# Patient Record
Sex: MALE | Race: WHITE | NOT HISPANIC OR LATINO | Employment: FULL TIME | URBAN - METROPOLITAN AREA
[De-identification: names, ages, dates, MRNs, and addresses within clinical notes are randomized per-mention and may not be internally consistent; named-entity substitution may affect disease eponyms.]

---

## 2020-07-17 ENCOUNTER — OFFICE VISIT (OUTPATIENT)
Dept: OBGYN CLINIC | Facility: CLINIC | Age: 25
End: 2020-07-17
Payer: COMMERCIAL

## 2020-07-17 ENCOUNTER — APPOINTMENT (OUTPATIENT)
Dept: RADIOLOGY | Facility: CLINIC | Age: 25
End: 2020-07-17
Payer: COMMERCIAL

## 2020-07-17 VITALS
TEMPERATURE: 97.9 F | WEIGHT: 235 LBS | BODY MASS INDEX: 31.83 KG/M2 | HEIGHT: 72 IN | SYSTOLIC BLOOD PRESSURE: 132 MMHG | DIASTOLIC BLOOD PRESSURE: 70 MMHG

## 2020-07-17 DIAGNOSIS — G57.01 PIRIFORMIS SYNDROME OF RIGHT SIDE: ICD-10-CM

## 2020-07-17 DIAGNOSIS — M62.830 LUMBAR PARASPINAL MUSCLE SPASM: ICD-10-CM

## 2020-07-17 DIAGNOSIS — M54.50 LUMBAR PAIN: ICD-10-CM

## 2020-07-17 DIAGNOSIS — M54.50 LUMBAR PAIN: Primary | ICD-10-CM

## 2020-07-17 PROCEDURE — 99203 OFFICE O/P NEW LOW 30 MIN: CPT | Performed by: FAMILY MEDICINE

## 2020-07-17 PROCEDURE — 72110 X-RAY EXAM L-2 SPINE 4/>VWS: CPT

## 2020-07-17 RX ORDER — PREDNISONE 20 MG/1
40 TABLET ORAL DAILY
Qty: 10 TABLET | Refills: 0 | Status: SHIPPED | OUTPATIENT
Start: 2020-07-17 | End: 2020-07-22

## 2020-07-17 RX ORDER — IBUPROFEN 400 MG/1
TABLET ORAL EVERY 6 HOURS PRN
COMMUNITY

## 2020-07-17 RX ORDER — CYCLOBENZAPRINE HCL 10 MG
10 TABLET ORAL 3 TIMES DAILY PRN
Qty: 30 TABLET | Refills: 0 | Status: SHIPPED | OUTPATIENT
Start: 2020-07-17

## 2020-07-17 NOTE — PROGRESS NOTES
Assessment:     1  Lumbar pain    2  Piriformis syndrome of right side    3  Lumbar paraspinal muscle spasm        Plan:     Problem List Items Addressed This Visit        Nervous and Auditory    Piriformis syndrome of right side    Relevant Medications    predniSONE 20 mg tablet    cyclobenzaprine (FLEXERIL) 10 mg tablet       Other    Lumbar paraspinal muscle spasm    Relevant Medications    predniSONE 20 mg tablet    cyclobenzaprine (FLEXERIL) 10 mg tablet      Other Visit Diagnoses     Lumbar pain    -  Primary    Relevant Medications    predniSONE 20 mg tablet    cyclobenzaprine (FLEXERIL) 10 mg tablet    Other Relevant Orders    XR spine lumbar minimum 4 views non injury         Subjective:     Patient ID: Jacobo Jiang is a 21 y o  male  Chief Complaint:  Patient is a 54-year-old male presenting today for evaluation of low back pain  He reports approximately 1 week ago when sitting down into a chair feeling immediate onset of pain in the lower back described as a pulling sensation  Pain does radiate into the left buttocks region as a tightness sensation and extends down into the posterior aspect of his leg  He has been using 800 mg of ibuprofen daily which seems to provide minimal relief  He does not report any noted weakness in his lower extremities  He denies any loss of bowel bladder function  He denies any saddle anesthesia  Allergy:  No Known Allergies  Medications:  all current active meds have been reviewed  Past Medical History:  History reviewed  No pertinent past medical history  Past Surgical History:  History reviewed  No pertinent surgical history  Family History:  History reviewed  No pertinent family history    Social History:  Social History     Substance and Sexual Activity   Alcohol Use Not on file     Social History     Substance and Sexual Activity   Drug Use Not on file     Social History     Tobacco Use   Smoking Status Never Smoker   Smokeless Tobacco Never Used     Review of Systems   Constitutional: Negative  HENT: Negative  Eyes: Negative  Respiratory: Negative  Cardiovascular: Negative  Gastrointestinal: Negative  Genitourinary: Negative  Musculoskeletal: Positive for arthralgias, back pain and myalgias  Skin: Negative  Allergic/Immunologic: Negative  Neurological: Negative  Hematological: Negative  Psychiatric/Behavioral: Negative  Objective:  BP Readings from Last 1 Encounters:   07/17/20 132/70      Wt Readings from Last 1 Encounters:   07/17/20 107 kg (235 lb)      BMI:   Estimated body mass index is 31 87 kg/m² as calculated from the following:    Height as of this encounter: 6' (1 829 m)  Weight as of this encounter: 107 kg (235 lb)  BSA:   Estimated body surface area is 2 29 meters squared as calculated from the following:    Height as of this encounter: 6' (1 829 m)  Weight as of this encounter: 107 kg (235 lb)  Physical Exam   Constitutional: He is oriented to person, place, and time  He appears well-developed and well-nourished  HENT:   Head: Normocephalic  Eyes: Pupils are equal, round, and reactive to light  Neck: Normal range of motion  Pulmonary/Chest: Effort normal    Musculoskeletal: Normal range of motion  Neurological: He is alert and oriented to person, place, and time  Skin: Skin is warm  Psychiatric: He has a normal mood and affect  Back Exam     Tenderness   The patient is experiencing tenderness in the lumbar and sacroiliac  Range of Motion   Extension: normal   Flexion: normal     Tests   Straight leg raise right: negative  Straight leg raise left: negative    Other   Sensation: normal    Comments:  5/5 muscle strength with great toe extension ankle inversion and eversion bilaterally            I have personally reviewed pertinent films in PACS     No acute osseous abnormalities

## 2022-12-31 ENCOUNTER — OFFICE VISIT (OUTPATIENT)
Dept: URGENT CARE | Facility: CLINIC | Age: 27
End: 2022-12-31

## 2022-12-31 ENCOUNTER — HOSPITAL ENCOUNTER (EMERGENCY)
Facility: HOSPITAL | Age: 27
Discharge: HOME | End: 2022-12-31
Attending: EMERGENCY MEDICINE

## 2022-12-31 ENCOUNTER — APPOINTMENT (EMERGENCY)
Dept: CT IMAGING | Facility: HOSPITAL | Age: 27
End: 2022-12-31

## 2022-12-31 VITALS
SYSTOLIC BLOOD PRESSURE: 140 MMHG | HEIGHT: 73 IN | BODY MASS INDEX: 32.47 KG/M2 | RESPIRATION RATE: 20 BRPM | HEART RATE: 77 BPM | DIASTOLIC BLOOD PRESSURE: 86 MMHG | WEIGHT: 245 LBS | OXYGEN SATURATION: 97 % | TEMPERATURE: 98.2 F

## 2022-12-31 VITALS
OXYGEN SATURATION: 97 % | WEIGHT: 250 LBS | SYSTOLIC BLOOD PRESSURE: 139 MMHG | HEIGHT: 73 IN | TEMPERATURE: 97.5 F | BODY MASS INDEX: 33.13 KG/M2 | RESPIRATION RATE: 18 BRPM | HEART RATE: 73 BPM | DIASTOLIC BLOOD PRESSURE: 81 MMHG

## 2022-12-31 DIAGNOSIS — K57.90 DIVERTICULOSIS: ICD-10-CM

## 2022-12-31 DIAGNOSIS — R10.9 ABDOMINAL PAIN: Primary | ICD-10-CM

## 2022-12-31 DIAGNOSIS — K21.9 GERD (GASTROESOPHAGEAL REFLUX DISEASE): ICD-10-CM

## 2022-12-31 DIAGNOSIS — K65.4 MESENTERIC PANNICULITIS (HCC): ICD-10-CM

## 2022-12-31 DIAGNOSIS — R10.31 RIGHT LOWER QUADRANT ABDOMINAL PAIN: Primary | ICD-10-CM

## 2022-12-31 LAB
ALBUMIN SERPL BCP-MCNC: 4.1 G/DL (ref 3.5–5)
ALP SERPL-CCNC: 61 U/L (ref 46–116)
ALT SERPL W P-5'-P-CCNC: 45 U/L (ref 12–78)
ANION GAP SERPL CALCULATED.3IONS-SCNC: 4 MMOL/L (ref 4–13)
AST SERPL W P-5'-P-CCNC: 32 U/L (ref 5–45)
BASOPHILS # BLD AUTO: 0.03 THOUSANDS/ÂΜL (ref 0–0.1)
BASOPHILS NFR BLD AUTO: 1 % (ref 0–1)
BILIRUB SERPL-MCNC: 0.4 MG/DL (ref 0.2–1)
BUN SERPL-MCNC: 15 MG/DL (ref 5–25)
CALCIUM SERPL-MCNC: 9.2 MG/DL (ref 8.3–10.1)
CARDIAC TROPONIN I PNL SERPL HS: 3 NG/L
CHLORIDE SERPL-SCNC: 106 MMOL/L (ref 96–108)
CO2 SERPL-SCNC: 30 MMOL/L (ref 21–32)
CREAT SERPL-MCNC: 0.88 MG/DL (ref 0.6–1.3)
EOSINOPHIL # BLD AUTO: 0.26 THOUSAND/ÂΜL (ref 0–0.61)
EOSINOPHIL NFR BLD AUTO: 4 % (ref 0–6)
ERYTHROCYTE [DISTWIDTH] IN BLOOD BY AUTOMATED COUNT: 12.1 % (ref 11.6–15.1)
GFR SERPL CREATININE-BSD FRML MDRD: 116 ML/MIN/1.73SQ M
GLUCOSE SERPL-MCNC: 88 MG/DL (ref 65–140)
HCT VFR BLD AUTO: 44.7 % (ref 36.5–49.3)
HGB BLD-MCNC: 15.1 G/DL (ref 12–17)
IMM GRANULOCYTES # BLD AUTO: 0.02 THOUSAND/UL (ref 0–0.2)
IMM GRANULOCYTES NFR BLD AUTO: 0 % (ref 0–2)
LIPASE SERPL-CCNC: 85 U/L (ref 73–393)
LYMPHOCYTES # BLD AUTO: 2.23 THOUSANDS/ÂΜL (ref 0.6–4.47)
LYMPHOCYTES NFR BLD AUTO: 34 % (ref 14–44)
MCH RBC QN AUTO: 30.9 PG (ref 26.8–34.3)
MCHC RBC AUTO-ENTMCNC: 33.8 G/DL (ref 31.4–37.4)
MCV RBC AUTO: 91 FL (ref 82–98)
MONOCYTES # BLD AUTO: 0.73 THOUSAND/ÂΜL (ref 0.17–1.22)
MONOCYTES NFR BLD AUTO: 11 % (ref 4–12)
NEUTROPHILS # BLD AUTO: 3.36 THOUSANDS/ÂΜL (ref 1.85–7.62)
NEUTS SEG NFR BLD AUTO: 50 % (ref 43–75)
NRBC BLD AUTO-RTO: 0 /100 WBCS
PLATELET # BLD AUTO: 284 THOUSANDS/UL (ref 149–390)
PMV BLD AUTO: 9.8 FL (ref 8.9–12.7)
POTASSIUM SERPL-SCNC: 3.9 MMOL/L (ref 3.5–5.3)
PROT SERPL-MCNC: 6.9 G/DL (ref 6.4–8.4)
RBC # BLD AUTO: 4.89 MILLION/UL (ref 3.88–5.62)
SODIUM SERPL-SCNC: 140 MMOL/L (ref 135–147)
WBC # BLD AUTO: 6.63 THOUSAND/UL (ref 4.31–10.16)

## 2022-12-31 RX ORDER — PANTOPRAZOLE SODIUM 20 MG/1
20 TABLET, DELAYED RELEASE ORAL DAILY
Qty: 7 TABLET | Refills: 0 | Status: SHIPPED | OUTPATIENT
Start: 2022-12-31 | End: 2023-01-07

## 2022-12-31 RX ORDER — PANTOPRAZOLE SODIUM 40 MG/1
40 TABLET, DELAYED RELEASE ORAL ONCE
Status: COMPLETED | OUTPATIENT
Start: 2022-12-31 | End: 2022-12-31

## 2022-12-31 RX ADMIN — SODIUM CHLORIDE 1000 ML: 0.9 INJECTION, SOLUTION INTRAVENOUS at 19:09

## 2022-12-31 RX ADMIN — IOHEXOL 100 ML: 350 INJECTION, SOLUTION INTRAVENOUS at 20:21

## 2022-12-31 RX ADMIN — PANTOPRAZOLE SODIUM 40 MG: 40 TABLET, DELAYED RELEASE ORAL at 21:55

## 2022-12-31 NOTE — PROGRESS NOTES
[facility]        NAME: Blanche Hagan is a 28 y o  male  : 1995    MRN: [id number]  DATE: 2022  TIME: 6:51 PM    Assessment and Plan   Right lower quadrant abdominal pain [R10 31]  1  Right lower quadrant abdominal pain  Transfer to other facility            Patient Instructions       Follow up with PCP in 3-5 days  Proceed to  ER if symptoms worsen  Chief Complaint     Chief Complaint   Patient presents with   • Abdominal Pain     PT presents with left upper quadrant pain that is colicky x 4 weeks  Pt also states indigestion, and states he gets lightheaded sometimes during these episodes  History of Present Illness       79-year-old male presents with fever sensation and lightheadedness abdominal pain black stools  Denies any vomiting or diarrhea  He reports that appetite has not been the same for the past week and not being very hungry and wanting to eat stuff  Reports he has been using Pepto-Bismol over the past week  Denies any chest pain shortness of breath  Reports he is getting waves of sweats and fever sensation and he gets very lightheaded  Abdominal Pain  This is a new problem  The current episode started 1 to 4 weeks ago  The onset quality is gradual  The problem occurs constantly  The problem has been gradually worsening  The pain is located in the generalized abdominal region  The pain is moderate  The quality of the pain is burning  The abdominal pain does not radiate  Associated symptoms include anorexia, belching, a fever, flatus and melena  Pertinent negatives include no arthralgias, constipation, diarrhea, dysuria, frequency, headaches, hematochezia, hematuria, myalgias, nausea, vomiting or weight loss  Nothing aggravates the pain  The pain is relieved by being still  Treatments tried: Pepto-Bismol and Tums  The treatment provided no relief  There is no history of abdominal surgery         Review of Systems   Review of Systems   Constitutional: Positive for fever  Negative for weight loss  HENT: Negative  Eyes: Negative  Respiratory: Negative  Cardiovascular: Negative  Gastrointestinal: Positive for abdominal pain, anorexia, flatus and melena  Negative for constipation, diarrhea, hematochezia, nausea and vomiting  Genitourinary: Negative for dysuria, frequency and hematuria  Musculoskeletal: Negative  Negative for arthralgias and myalgias  Skin: Negative  Neurological: Negative  Negative for headaches  Current Medications       Current Outpatient Medications:   •  cyclobenzaprine (FLEXERIL) 10 mg tablet, Take 1 tablet (10 mg total) by mouth 3 (three) times a day as needed for muscle spasms (Patient not taking: Reported on 12/31/2022), Disp: 30 tablet, Rfl: 0  •  ibuprofen (MOTRIN) 400 mg tablet, Take by mouth every 6 (six) hours as needed for mild pain (Patient not taking: Reported on 12/31/2022), Disp: , Rfl:     Current Allergies     Allergies as of 12/31/2022   • (No Known Allergies)            The following portions of the patient's history were reviewed and updated as appropriate: allergies, current medications, past family history, past medical history, past social history, past surgical history and problem list      History reviewed  No pertinent past medical history  No past surgical history on file  No family history on file  Medications have been verified  Objective   /81   Pulse 73   Temp 97 5 °F (36 4 °C)   Resp 18   Ht 6' 1" (1 854 m)   Wt 113 kg (250 lb)   SpO2 97%   BMI 32 98 kg/m²   No LMP for male patient  Physical Exam     Physical Exam  Vitals and nursing note reviewed  Constitutional:       General: He is not in acute distress  Appearance: Normal appearance  He is well-developed  HENT:      Head: Normocephalic and atraumatic  Right Ear: Hearing, tympanic membrane, ear canal and external ear normal  There is no impacted cerumen        Left Ear: Hearing, tympanic membrane, ear canal and external ear normal  There is no impacted cerumen  Nose: Nose normal       Mouth/Throat:      Pharynx: Uvula midline  No oropharyngeal exudate  Eyes:      General:         Right eye: No discharge  Left eye: No discharge  Conjunctiva/sclera: Conjunctivae normal    Cardiovascular:      Rate and Rhythm: Normal rate and regular rhythm  Heart sounds: Normal heart sounds  No murmur heard  Pulmonary:      Effort: Pulmonary effort is normal  No respiratory distress  Breath sounds: Normal breath sounds  No wheezing or rales  Abdominal:      General: Bowel sounds are normal       Palpations: Abdomen is soft  Tenderness: There is abdominal tenderness in the right lower quadrant  There is guarding  There is no right CVA tenderness, left CVA tenderness or rebound  Positive signs include Rovsing's sign and McBurney's sign  Negative signs include De Leon's sign  Musculoskeletal:         General: Normal range of motion  Cervical back: Normal range of motion and neck supple  Lymphadenopathy:      Cervical: No cervical adenopathy  Skin:     General: Skin is warm and dry  Neurological:      Mental Status: He is alert and oriented to person, place, and time     Psychiatric:         Mood and Affect: Mood normal          Patient sent to ER via personal vehicle to rule out possible appendicitis

## 2023-01-01 NOTE — ED PROVIDER NOTES
History  Chief Complaint   Patient presents with   • Abdominal Pain     RLQ abdominal pain when pressed by doctor at [facility]  C/O of being lightheadedness  28year old male sent from urgent care for evaluation of right lower quadrant abdominal tenderness on their exam  Patient states he has had burning epigastric pain radiating to the substernal chest intermittently for the past 3 weeks  Patient states these episodes occur at random times with no exacerbating or alleviating factors  Patient reports that he has been having belching, diaphoresis and lightheadedness following the onset of this burning pain  He has been taking Pepto Bismol and Mylanta for his symptoms up to twice daily with last dose taken this afternoon around 2:30 pm  Patient reports dark stools since starting Pepto Bismol  Patient reports drinking 12-13 seltzers on Fridays and Saturdays  No smoking or drug use  History provided by:  Patient  Abdominal Pain  Pain location:  Epigastric  Pain quality: burning    Pain radiates to:  Chest  Duration:  3 weeks  Timing:  Intermittent  Chronicity:  New  Relieved by:  Nothing  Worsened by:  Nothing  Ineffective treatments:  OTC medications  Associated symptoms: chest pain and chills    Associated symptoms: no constipation, no cough, no diarrhea, no fever, no nausea, no shortness of breath and no vomiting    Risk factors: has not had multiple surgeries        Prior to Admission Medications   Prescriptions Last Dose Informant Patient Reported? Taking? cyclobenzaprine (FLEXERIL) 10 mg tablet   No No   Sig: Take 1 tablet (10 mg total) by mouth 3 (three) times a day as needed for muscle spasms   Patient not taking: Reported on 12/31/2022   ibuprofen (MOTRIN) 400 mg tablet   Yes No   Sig: Take by mouth every 6 (six) hours as needed for mild pain   Patient not taking: Reported on 12/31/2022      Facility-Administered Medications: None       No past medical history on file      No past surgical history on file  No family history on file  I have reviewed and agree with the history as documented  E-Cigarette/Vaping     E-Cigarette/Vaping Substances     Social History     Tobacco Use   • Smoking status: Never   • Smokeless tobacco: Never   Substance Use Topics   • Alcohol use: Never   • Drug use: Never       Review of Systems   Constitutional: Positive for chills  Negative for fever  HENT: Negative for congestion  Respiratory: Negative for cough and shortness of breath  Cardiovascular: Positive for chest pain  Gastrointestinal: Positive for abdominal pain  Negative for constipation, diarrhea, nausea and vomiting  Neurological: Positive for light-headedness  Negative for headaches  All other systems reviewed and are negative  Physical Exam  Physical Exam  Vitals and nursing note reviewed  Constitutional:       General: He is not in acute distress  Appearance: He is well-developed  He is not toxic-appearing or diaphoretic  HENT:      Head: Normocephalic and atraumatic  Right Ear: External ear normal       Left Ear: External ear normal       Nose: Nose normal    Eyes:      General: No scleral icterus  Cardiovascular:      Rate and Rhythm: Normal rate and regular rhythm  Pulses: Normal pulses  Pulmonary:      Effort: Pulmonary effort is normal  No respiratory distress  Abdominal:      General: There is no distension  Palpations: Abdomen is soft  Tenderness: There is no abdominal tenderness  Musculoskeletal:         General: No deformity  Normal range of motion  Skin:     Findings: No rash  Neurological:      General: No focal deficit present  Mental Status: He is alert        Gait: Gait normal    Psychiatric:         Mood and Affect: Mood normal          Vital Signs  ED Triage Vitals [12/31/22 1859]   Temperature Pulse Respirations Blood Pressure SpO2   98 2 °F (36 8 °C) 79 20 170/88 100 %      Temp Source Heart Rate Source Patient Position - Orthostatic VS BP Location FiO2 (%)   Oral Monitor -- Right arm --      Pain Score       No Pain           Vitals:    12/31/22 1859 12/31/22 2030 12/31/22 2045   BP: 170/88  144/65   Pulse: 79 78 75         Visual Acuity      ED Medications  Medications   pantoprazole (PROTONIX) EC tablet 40 mg (has no administration in time range)   sodium chloride 0 9 % bolus 1,000 mL (0 mL Intravenous Stopped 12/31/22 2032)   iohexol (OMNIPAQUE) 350 MG/ML injection (SINGLE-DOSE) 100 mL (100 mL Intravenous Given 12/31/22 2021)       Diagnostic Studies  Results Reviewed     Procedure Component Value Units Date/Time    HS Troponin 0hr (reflex protocol) [[id number]]  (Normal) Collected: 12/31/22 1908    Lab Status: Final result Specimen: Blood from Arm, Left Updated: 12/31/22 1943     hs TnI 0hr 3 ng/L     Comprehensive metabolic panel [[id number]] Collected: 12/31/22 1908    Lab Status: Final result Specimen: Blood from Arm, Left Updated: 12/31/22 1942     Sodium 140 mmol/L      Potassium 3 9 mmol/L      Chloride 106 mmol/L      CO2 30 mmol/L      ANION GAP 4 mmol/L      BUN 15 mg/dL      Creatinine 0 88 mg/dL      Glucose 88 mg/dL      Calcium 9 2 mg/dL      AST 32 U/L      ALT 45 U/L      Alkaline Phosphatase 61 U/L      Total Protein 6 9 g/dL      Albumin 4 1 g/dL      Total Bilirubin 0 40 mg/dL      eGFR 116 ml/min/1 73sq m     Narrative:      [place] guidelines for Chronic Kidney Disease (CKD):   •  Stage 1 with normal or high GFR (GFR > 90 mL/min/1 73 square meters)  •  Stage 2 Mild CKD (GFR = 60-89 mL/min/1 73 square meters)  •  Stage 3A Moderate CKD (GFR = 45-59 mL/min/1 73 square meters)  •  Stage 3B Moderate CKD (GFR = 30-44 mL/min/1 73 square meters)  •  Stage 4 Severe CKD (GFR = 15-29 mL/min/1 73 square meters)  •  Stage 5 End Stage CKD (GFR <15 mL/min/1 73 square meters)  Note: GFR calculation is accurate only with a steady state creatinine    Lipase [[id number]]  (Normal) Collected: 12/31/22 1908    Lab Status: Final result Specimen: Blood from Arm, Left Updated: 12/31/22 1942     Lipase 85 u/L     CBC and differential [[id number]] Collected: 12/31/22 1908    Lab Status: Final result Specimen: Blood from Arm, Left Updated: 12/31/22 1915     WBC 6 63 Thousand/uL      RBC 4 89 Million/uL      Hemoglobin 15 1 g/dL      Hematocrit 44 7 %      MCV 91 fL      MCH 30 9 pg      MCHC 33 8 g/dL      RDW 12 1 %      MPV 9 8 fL      Platelets 484 Thousands/uL      nRBC 0 /100 WBCs      Neutrophils Relative 50 %      Immat GRANS % 0 %      Lymphocytes Relative 34 %      Monocytes Relative 11 %      Eosinophils Relative 4 %      Basophils Relative 1 %      Neutrophils Absolute 3 36 Thousands/µL      Immature Grans Absolute 0 02 Thousand/uL      Lymphocytes Absolute 2 23 Thousands/µL      Monocytes Absolute 0 73 Thousand/µL      Eosinophils Absolute 0 26 Thousand/µL      Basophils Absolute 0 03 Thousands/µL                  CT abdomen pelvis with contrast   Final Result by Skyler Sandoval MD (12/31 2109)      No acute intra-abdominal abnormality  No free air or free fluid  Normal appendix visualized  Colonic diverticulosis with no inflammatory changes present to suggest acute diverticulitis  Multiple prominent but nonenlarged mesenteric root lymph nodes with minimal haziness of the mesenteric root fat suggestive of a chronic mesenteric panniculitis  No prior studies are available for comparison  Consider a repeat CT abdomen/pelvis in 6    months to assess for stability        Workstation performed: [id number]                    Procedures  ECG 12 Lead Documentation Only    Date/Time: 12/31/2022 7:05 PM  Performed by: Cherelle Castro MD  Authorized by: Cherelle Castro MD     Indications / Diagnosis:  Chest pain, lightheaded  ECG reviewed by me, the ED Provider: yes    Patient location:  ED  Previous ECG:     Previous ECG:  Unavailable  Interpretation:     Interpretation: normal Rate:     ECG rate:  78    ECG rate assessment: normal    Rhythm:     Rhythm: sinus rhythm    Ectopy:     Ectopy: none    QRS:     QRS axis:  Normal    QRS intervals:  Normal  Conduction:     Conduction: normal    ST segments:     ST segments:  Normal  T waves:     T waves: normal               ED Course             HEART Risk Score    Flowsheet Row Most Recent Value   Heart Score Risk Calculator    History 0 Filed at: 12/31/2022 2130   ECG 0 Filed at: 12/31/2022 2130   Age 0 Filed at: 12/31/2022 2130   Risk Factors 0 Filed at: 12/31/2022 2130   Troponin 0 Filed at: 12/31/2022 2130   HEART Score 0 Filed at: 12/31/2022 2130                        SBIRT 20yo+    Flowsheet Row Most Recent Value   SBIRT (23 yo +)    In order to provide better care to our patients, we are screening all of our patients for alcohol and drug use  Would it be okay to ask you these screening questions? Yes Filed at: 12/31/2022 1901   Initial Alcohol Screen: US AUDIT-C     1  How often do you have a drink containing alcohol? 3 Filed at: 12/31/2022 1901   2  How many drinks containing alcohol do you have on a typical day you are drinking? 3 Filed at: 12/31/2022 1901   3a  Male UNDER 65: How often do you have five or more drinks on one occasion? 3 Filed at: 12/31/2022 1901   3b  FEMALE Any Age, or MALE 65+: How often do you have 4 or more drinks on one occassion? 0 Filed at: 12/31/2022 1901   Audit-C Score 9 Filed at: 12/31/2022 1901   Full Alcohol Screen: US AUDIT    4  How often during the last year have you found that you were not able to stop drinking once you had started? 0 Filed at: 12/31/2022 1901   5  How often during past year have you failed to do what was normally expected of you because of drinking? 0 Filed at: 12/31/2022 1901   6  How often in past year have you needed a first drink in the morning to get yourself going after a heavy drinking session? 0 Filed at: 12/31/2022 1901   7   How often in past year have you had feeling of guilt or remorse after drinking? 0 Filed at: 12/31/2022 1901   8  How often in past year have you been unable to remember what happened night before because you had been drinking? 0 Filed at: 12/31/2022 1901   9  Have you or someone else been injured as a result of your drinking? 0 Filed at: 12/31/2022 1901   10  Has a relative, friend, doctor or other health worker been concerned about your drinking and suggested you cut down?  0 Filed at: 12/31/2022 1901   AUDIT Total Score 9 Filed at: 12/31/2022 1901   UBALDO: How many times in the past year have you    Used an illegal drug or used a prescription medication for non-medical reasons? Never Filed at: 12/31/2022 1901                    MDM  Number of Diagnoses or Management Options  Abdominal pain: new and requires workup  Diverticulosis: minor  GERD (gastroesophageal reflux disease): new and requires workup  Mesenteric panniculitis [facility]): minor  Diagnosis management comments: 28year old male sent from urgent care for evaluation of abdominal pain  No abdominal tenderness on my exam; however, patient had been reported to have RLQ tenderness and guarding at urgent care  CT abd/pelvis without acute findings  Patient informed of incidental findings  Suspect symptoms secondary to GERD  Short course of protonix prescribed  PCP follow up  Return precautions provided         Amount and/or Complexity of Data Reviewed  Clinical lab tests: ordered and reviewed  Tests in the radiology section of CPT®: ordered and reviewed        Disposition  Final diagnoses:   Abdominal pain   Mesenteric panniculitis ([address] )   Diverticulosis   GERD (gastroesophageal reflux disease)     Time reflects when diagnosis was documented in both MDM as applicable and the Disposition within this note     Time User Action Codes Description Comment    12/31/2022  9:18 PM Anuradha Mejias Add [R10 9] Abdominal pain     12/31/2022  9:19 PM Anuradha Mejias Add [K65 4] Mesenteric panniculitis ([address] ) 12/31/2022  9:19 PM Kelsea Ross Add [K57 90] Diverticulosis     12/31/2022  9:24 PM Ibis Presley Add [K21 9] GERD (gastroesophageal reflux disease)       ED Disposition     ED Disposition   Discharge    Condition   Stable    Date/Time   Sat Dec 31, 2022  9:19 PM    Comment   [place] discharge to home/self care  Follow-up Information     Follow up With Specialties Details Why Contact Info Additional Information    your primary care provider  Schedule an appointment as soon as possible for a visit in 1 week for re-evaluation       [address] Emergency Department Emergency Medicine Go to  If symptoms worsen [address] [place],[address] [address]  [address] [facility] Emergency Department, [address] [facility] , [address]    [address]  Call  if you need help finding a primary care provider [phone]             Patient's Medications   Discharge Prescriptions    PANTOPRAZOLE (PROTONIX) 20 MG TABLET    Take 1 tablet (20 mg total) by mouth daily for 7 days       Start Date: 12/31/2022End Date: 1/7/2023       Order Dose: 20 mg       Quantity: 7 tablet    Refills: 0       No discharge procedures on file      PDMP Review     None          ED Provider  Electronically Signed by           Mario Anderson MD  12/31/22 2127       Mario Anderson MD  12/31/22 2130

## 2023-01-01 NOTE — DISCHARGE INSTRUCTIONS
Your CT showed "Multiple prominent but nonenlarged mesenteric root lymph nodes with minimal haziness of the mesenteric root fat suggestive of a chronic mesenteric panniculitis  No prior studies are available for comparison  Consider a repeat CT abdomen/pelvis in 6   months to assess for stability "  Please follow with your primary care provider to arrange for repeat CT in 6 months

## 2023-01-07 LAB
ATRIAL RATE: 78 BPM
P AXIS: 62 DEGREES
PR INTERVAL: 166 MS
QRS AXIS: 26 DEGREES
QRSD INTERVAL: 92 MS
QT INTERVAL: 360 MS
QTC INTERVAL: 410 MS
T WAVE AXIS: 58 DEGREES
VENTRICULAR RATE: 78 BPM

## 2023-05-02 ENCOUNTER — TELEPHONE (OUTPATIENT)
Dept: GASTROENTEROLOGY | Facility: CLINIC | Age: 28
End: 2023-05-02

## 2023-05-02 ENCOUNTER — OFFICE VISIT (OUTPATIENT)
Dept: GASTROENTEROLOGY | Facility: CLINIC | Age: 28
End: 2023-05-02

## 2023-05-02 VITALS
SYSTOLIC BLOOD PRESSURE: 142 MMHG | BODY MASS INDEX: 33.66 KG/M2 | WEIGHT: 254 LBS | HEIGHT: 73 IN | DIASTOLIC BLOOD PRESSURE: 84 MMHG

## 2023-05-02 DIAGNOSIS — K21.9 GASTROESOPHAGEAL REFLUX DISEASE, UNSPECIFIED WHETHER ESOPHAGITIS PRESENT: Primary | ICD-10-CM

## 2023-05-02 DIAGNOSIS — R07.89 OTHER CHEST PAIN: ICD-10-CM

## 2023-05-02 DIAGNOSIS — R93.5 ABNORMAL CT OF THE ABDOMEN: ICD-10-CM

## 2023-05-02 RX ORDER — OMEPRAZOLE 40 MG/1
40 CAPSULE, DELAYED RELEASE ORAL DAILY
Qty: 30 CAPSULE | Refills: 5 | Status: SHIPPED | OUTPATIENT
Start: 2023-05-02

## 2023-05-02 RX ORDER — OMEPRAZOLE 20 MG/1
20 CAPSULE, DELAYED RELEASE ORAL DAILY
COMMUNITY
End: 2023-05-02

## 2023-05-02 NOTE — TELEPHONE ENCOUNTER
Scheduled date of EGD(as of today):5/12/23  Physician performing EGD:LAWRENCE  Location of EGD:[facility]  Instructions reviewed with patient by:  Clearances: N

## 2023-05-02 NOTE — PROGRESS NOTES
[id number] - Outpatient Consultation  Jessica Oliveira 28 y o  male MRN: [id number]  Encounter: [id number]    ASSESSMENT AND PLAN:      1  Gastroesophageal reflux disease, unspecified whether esophagitis present  27-year-old male here to has a new patient after a ER visit back in December for chest pain  At that time, given PPI therapy with some resolution  Sounds like between the weight gain, excessive alcohol use which he did cut down about 3 months ago, stress of having a  (third daughter was born in 2022), his reflux is really aggravating him  -GERD lifestyle modifications  -Limit p o  intake in the evenings  -We will increase omeprazole to 40 mg daily  -Check EGD  - omeprazole (PriLOSEC) 40 MG capsule; Take 1 capsule (40 mg total) by mouth daily  Dispense: 30 capsule; Refill: 5    2  Abnormal CT of the abdomen  Noted on CT back in December when he was in the ER  Some panniculitis with some enlarged lymph nodes  We will follow-up sometime this summer     - Basic metabolic panel; Future  - CT abdomen pelvis w contrast; Future    3  Other chest pain        Followup Appointment: 3 months  ______________________________________________________________________    Chief Complaint   Patient presents with    GERD     Chest pain       HPI:   Jessica Oliveira is a 28y o  year old male who presents today for reflux and chest   Patient states that during , he did drink excessively and put on some weight  Cut down his alcohol intake significantly for the past 3 months  He also had his third daughter was born in 2022  From 2022, he started having severe heartburn and when they had such bad chest pain that he was worried he was having heart attack  He went to the emergency room, was ruled out for acute coronary syndrome  CT with some mild mesenteric inflammation  Labs otherwise unremarkable    Patient was sent home on 2 weeks of "omeprazole  He was having intermittent heartburn and gas and belching but was feeling okay until the past 3 weeks when all the symptoms started again  Back on the over-the-counter omeprazole but not enough  No dysphagia or odynophagia  No vomiting  No GI bleeding  Bowels are okay  Historical Information   Past Medical History:   Diagnosis Date    Anxiety     GERD (gastroesophageal reflux disease)      History reviewed  No pertinent surgical history  Social History     Substance and Sexual Activity   Alcohol Use Yes    Comment: weekends     Social History     Substance and Sexual Activity   Drug Use Never     Social History     Tobacco Use   Smoking Status Former    Types: Cigarettes   Smokeless Tobacco Former     Family History   Problem Relation Age of Onset    No Known Problems Mother     Cancer Father     No Known Problems Brother     Colon cancer Neg Hx     Colon polyps Neg Hx        Meds/Allergies     Current Outpatient Medications:     omeprazole (PriLOSEC) 40 MG capsule    No Known Allergies    PHYSICAL EXAM:    Blood pressure 142/84, height 6' 1\" (1 854 m), weight 115 kg (254 lb)  Body mass index is 33 51 kg/m²  General Appearance: NAD, cooperative, alert  Eyes: Anicteric, PERRLA, EOMI  ENT:  Normocephalic, atraumatic, normal mucosa  Neck:  Supple, symmetrical, trachea midline,   Resp:  Clear to auscultation bilaterally; no rales, rhonchi or wheezing; respirations unlabored   CV:  S1 S2, Regular rate and rhythm; no murmur, rub, or gallop  GI:  Soft, non-tender, non-distended; normal bowel sounds; no masses, no organomegaly   Rectal: Deferred  Musculoskeletal: No cyanosis, clubbing or edema  Normal ROM    Skin:  No jaundice, rashes, or lesions   Heme/Lymph: No palpable cervical lymphadenopathy  Psych: Normal affect, good eye contact  Neuro: No gross deficits, AAOx3    Lab Results:   Lab Results   Component Value Date    WBC 6 63 12/31/2022    HGB 15 1 12/31/2022    HCT 44 7 12/31/2022 " MCV 91 12/31/2022     12/31/2022     Lab Results   Component Value Date    K 3 9 12/31/2022     12/31/2022    CO2 30 12/31/2022    BUN 15 12/31/2022    CREATININE 0 88 12/31/2022    CALCIUM 9 2 12/31/2022    AST 32 12/31/2022    ALT 45 12/31/2022    ALKPHOS 61 12/31/2022    EGFR 116 12/31/2022     No results found for: IRON, TIBC, FERRITIN  Lab Results   Component Value Date    LIPASE 85 12/31/2022       Radiology Results:   No results found  REVIEW OF SYSTEMS:    CONSTITUTIONAL: Denies any fever, chills, rigors, and weight loss  HEENT: No earache or tinnitus  Denies hearing loss or visual disturbances  CARDIOVASCULAR: No chest pain or palpitations  RESPIRATORY: Denies any cough, hemoptysis, shortness of breath or dyspnea on exertion  GASTROINTESTINAL: As noted in the History of Present Illness  GENITOURINARY: No problems with urination  Denies any hematuria or dysuria  NEUROLOGIC: No dizziness or vertigo, denies headaches  MUSCULOSKELETAL: Denies any muscle or joint pain  SKIN: Denies skin rashes or itching  ENDOCRINE: Denies excessive thirst  Denies intolerance to heat or cold  PSYCHOSOCIAL: Denies depression or anxiety  Denies any recent memory loss

## 2023-05-11 ENCOUNTER — ANESTHESIA EVENT (OUTPATIENT)
Dept: ANESTHESIOLOGY | Facility: AMBULATORY SURGERY CENTER | Age: 28
End: 2023-05-11

## 2023-05-11 ENCOUNTER — ANESTHESIA (OUTPATIENT)
Dept: ANESTHESIOLOGY | Facility: AMBULATORY SURGERY CENTER | Age: 28
End: 2023-05-11

## 2023-05-12 ENCOUNTER — ANESTHESIA EVENT (OUTPATIENT)
Dept: GASTROENTEROLOGY | Facility: AMBULATORY SURGERY CENTER | Age: 28
End: 2023-05-12

## 2023-05-12 ENCOUNTER — ANESTHESIA (OUTPATIENT)
Dept: GASTROENTEROLOGY | Facility: AMBULATORY SURGERY CENTER | Age: 28
End: 2023-05-12

## 2023-05-12 ENCOUNTER — HOSPITAL ENCOUNTER (OUTPATIENT)
Dept: GASTROENTEROLOGY | Facility: AMBULATORY SURGERY CENTER | Age: 28
Discharge: HOME | End: 2023-05-12
Attending: INTERNAL MEDICINE

## 2023-05-12 VITALS
SYSTOLIC BLOOD PRESSURE: 131 MMHG | HEART RATE: 67 BPM | TEMPERATURE: 98.2 F | BODY MASS INDEX: 32.47 KG/M2 | WEIGHT: 245 LBS | HEIGHT: 73 IN | RESPIRATION RATE: 15 BRPM | DIASTOLIC BLOOD PRESSURE: 65 MMHG | OXYGEN SATURATION: 97 %

## 2023-05-12 DIAGNOSIS — R07.89 OTHER CHEST PAIN: Primary | ICD-10-CM

## 2023-05-12 DIAGNOSIS — R93.5 ABNORMAL CT OF THE ABDOMEN: ICD-10-CM

## 2023-05-12 DIAGNOSIS — K21.9 GASTROESOPHAGEAL REFLUX DISEASE, UNSPECIFIED WHETHER ESOPHAGITIS PRESENT: ICD-10-CM

## 2023-05-12 RX ORDER — PROPOFOL 10 MG/ML
INJECTION, EMULSION INTRAVENOUS AS NEEDED
Status: DISCONTINUED | OUTPATIENT
Start: 2023-05-12 | End: 2023-05-12

## 2023-05-12 RX ORDER — LIDOCAINE HYDROCHLORIDE 10 MG/ML
INJECTION, SOLUTION EPIDURAL; INFILTRATION; INTRACAUDAL; PERINEURAL AS NEEDED
Status: DISCONTINUED | OUTPATIENT
Start: 2023-05-12 | End: 2023-05-12

## 2023-05-12 RX ORDER — SODIUM CHLORIDE, SODIUM LACTATE, POTASSIUM CHLORIDE, CALCIUM CHLORIDE 600; 310; 30; 20 MG/100ML; MG/100ML; MG/100ML; MG/100ML
50 INJECTION, SOLUTION INTRAVENOUS CONTINUOUS
Status: DISCONTINUED | OUTPATIENT
Start: 2023-05-12 | End: 2023-05-12

## 2023-05-12 RX ORDER — ALBUTEROL SULFATE 90 UG/1
AEROSOL, METERED RESPIRATORY (INHALATION) AS NEEDED
Status: DISCONTINUED | OUTPATIENT
Start: 2023-05-12 | End: 2023-05-12

## 2023-05-12 RX ADMIN — PROPOFOL 200 MG: 10 INJECTION, EMULSION INTRAVENOUS at 14:23

## 2023-05-12 RX ADMIN — PROPOFOL 80 MG: 10 INJECTION, EMULSION INTRAVENOUS at 14:27

## 2023-05-12 RX ADMIN — PROPOFOL 70 MG: 10 INJECTION, EMULSION INTRAVENOUS at 14:33

## 2023-05-12 RX ADMIN — LIDOCAINE HYDROCHLORIDE 100 MG: 10 INJECTION, SOLUTION EPIDURAL; INFILTRATION; INTRACAUDAL; PERINEURAL at 14:22

## 2023-05-12 RX ADMIN — PROPOFOL 200 MG: 10 INJECTION, EMULSION INTRAVENOUS at 14:22

## 2023-05-12 RX ADMIN — SODIUM CHLORIDE, SODIUM LACTATE, POTASSIUM CHLORIDE, CALCIUM CHLORIDE 50 ML/HR: 600; 310; 30; 20 INJECTION, SOLUTION INTRAVENOUS at 13:45

## 2023-05-12 RX ADMIN — ALBUTEROL SULFATE 2 PUFF: 90 AEROSOL, METERED RESPIRATORY (INHALATION) at 14:19

## 2023-05-12 RX ADMIN — PROPOFOL 70 MG: 10 INJECTION, EMULSION INTRAVENOUS at 14:25

## 2023-05-12 RX ADMIN — PROPOFOL 50 MG: 10 INJECTION, EMULSION INTRAVENOUS at 14:30

## 2023-05-12 NOTE — H&P
"History and Physical - [facility]    Vivi Conrad 28 y o  male MRN: [id number]      HPI: Vivi Conrad is a 28y o  year old male who presents for reflux  No Known Allergies      REVIEW OF SYSTEMS: Per the HPI, and otherwise unremarkable  Historical Information     Past Medical History:   Diagnosis Date   • Anxiety    • GERD (gastroesophageal reflux disease)      Past Surgical History:   Procedure Laterality Date   • EGD     • WISDOM TOOTH EXTRACTION       Social History   Social History     Substance and Sexual Activity   Alcohol Use Yes   • Alcohol/week: 30 0 standard drinks   • Types: 30 Cans of beer per week    Comment: weekends     Social History     Substance and Sexual Activity   Drug Use Never     Social History     Tobacco Use   Smoking Status Former   • Types: Cigarettes   • Quit date:    • Years since quittin 3   Smokeless Tobacco Former     Family History   Problem Relation Age of Onset   • No Known Problems Mother    • Cancer Father    • No Known Problems Brother    • Colon cancer Neg Hx    • Colon polyps Neg Hx        Meds/Allergies       Current Outpatient Medications:   •  omeprazole (PriLOSEC) 40 MG capsule    Current Facility-Administered Medications:   •  lactated ringers infusion, 50 mL/hr, Intravenous, Continuous, 50 mL/hr at 23 1345        Objective     /62   Pulse 63   Temp 98 2 °F (36 8 °C) (Temporal)   Resp 20   Ht 6' 1\" (1 854 m)   Wt 111 kg (245 lb)   SpO2 97%   BMI 32 32 kg/m²       PHYSICAL EXAM    Gen: NAD AAOx3  Head: Normocephalic, Atraumatic  CV: S1S2 RRR no m/r/g  CHEST: Clear b/l no c/r/w  ABD: soft, +BS NT/ND no masses  EXT: no edema      ASSESSMENT/PLAN:  This is a 28y o  year old male here for EGD, and he is stable and optimized for his procedure        "

## 2023-05-12 NOTE — ANESTHESIA POSTPROCEDURE EVALUATION
Post-Op Assessment Note    CV Status:  Stable  Pain Score: 0    Pain management: adequate     Mental Status:  Sleepy   Hydration Status:  Stable   PONV Controlled:  None   Airway Patency:  Patent      Post Op Vitals Reviewed: Yes      Staff: CRNA         No notable events documented      BP   119/57 (82)   Temp      Pulse   78   Resp   14   SpO2   95% on RA

## 2023-05-12 NOTE — ANESTHESIA PREPROCEDURE EVALUATION
Procedure:  EGD    Relevant Problems   CARDIO   (+) Other chest pain      GI/HEPATIC   (+) Gastroesophageal reflux disease      MUSCULOSKELETAL   (+) Piriformis syndrome of right side        Physical Exam    Airway    Mallampati score: II  TM Distance: >3 FB  Neck ROM: full     Dental   No notable dental hx     Cardiovascular      Pulmonary      Other Findings        Anesthesia Plan  ASA Score- 2     Anesthesia Type- IV sedation with anesthesia with ASA Monitors  Additional Monitors:   Airway Plan:           Plan Factors-Exercise tolerance (METS): >4 METS  Chart reviewed  Patient is not a current smoker (Stopped 3 weeks ago)  Induction- intravenous  Postoperative Plan-     Informed Consent- Anesthetic plan and risks discussed with patient  I personally reviewed this patient with the CRNA  Discussed and agreed on the Anesthesia Plan with the CRNA  Teresa Yen

## 2023-05-12 NOTE — ANESTHESIA PREPROCEDURE EVALUATION
Procedure:  PRE-OP ONLY    Relevant Problems   CARDIO   (+) Other chest pain      GI/HEPATIC   (+) Gastroesophageal reflux disease      MUSCULOSKELETAL   (+) Piriformis syndrome of right side             Anesthesia Plan  ASA Score- 2     Anesthesia Type- IV sedation with anesthesia with ASA Monitors  Additional Monitors:   Airway Plan:           Plan Factors-    Chart reviewed  Patient is not a current smoker  Induction- intravenous  Postoperative Plan-     Informed Consent- Anesthetic plan and risks discussed with patient  I personally reviewed this patient with the CRNA  Discussed and agreed on the Anesthesia Plan with the CRNA  Sweetie Rutledge

## 2023-05-15 ENCOUNTER — LAB (OUTPATIENT)
Dept: LAB | Facility: HOSPITAL | Age: 28
End: 2023-05-15

## 2023-05-15 DIAGNOSIS — R93.5 ABNORMAL CT OF THE ABDOMEN: ICD-10-CM

## 2023-05-16 LAB
ANION GAP SERPL CALCULATED.3IONS-SCNC: -1 MMOL/L (ref 4–13)
BUN SERPL-MCNC: 10 MG/DL (ref 5–25)
CALCIUM SERPL-MCNC: 9.2 MG/DL (ref 8.3–10.1)
CHLORIDE SERPL-SCNC: 107 MMOL/L (ref 96–108)
CO2 SERPL-SCNC: 31 MMOL/L (ref 21–32)
CREAT SERPL-MCNC: 0.84 MG/DL (ref 0.6–1.3)
GFR SERPL CREATININE-BSD FRML MDRD: 119 ML/MIN/1.73SQ M
GLUCOSE P FAST SERPL-MCNC: 90 MG/DL (ref 65–99)
POTASSIUM SERPL-SCNC: 4.2 MMOL/L (ref 3.5–5.3)
SODIUM SERPL-SCNC: 137 MMOL/L (ref 135–147)

## 2023-05-17 LAB
ENDOMYSIUM IGA SER QL: POSITIVE
GLIADIN PEPTIDE IGA SER-ACNC: >150 UNITS (ref 0–19)
GLIADIN PEPTIDE IGG SER-ACNC: 108 UNITS (ref 0–19)
IGA SERPL-MCNC: 174 MG/DL (ref 90–386)
TTG IGA SER-ACNC: >100 U/ML (ref 0–3)
TTG IGG SER-ACNC: >100 U/ML (ref 0–5)

## 2023-05-17 NOTE — RESULT ENCOUNTER NOTE
Awaiting EGD biopsies but given EGD findings of scalloped mucosa and serologies as noted, pt with celiac disease  Recommend gluten free diet  Will call patient once EGD biopsies available

## 2023-05-18 ENCOUNTER — TELEPHONE (OUTPATIENT)
Dept: GASTROENTEROLOGY | Facility: CLINIC | Age: 28
End: 2023-05-18

## 2023-05-18 DIAGNOSIS — K90.0 CELIAC DISEASE: Primary | ICD-10-CM

## 2023-05-18 PROCEDURE — 88305 TISSUE EXAM BY PATHOLOGIST: CPT | Performed by: STUDENT IN AN ORGANIZED HEALTH CARE EDUCATION/TRAINING PROGRAM

## 2023-05-18 NOTE — TELEPHONE ENCOUNTER
Called patient with results of the blood work  Given the abnormal CT and the EGD mucosal findings along with the very positive celiac serologies - we need to go on a strict gluten free diet  Discussed avoiding cross contaminations       - Patient education provided  - Will also refer to nutritionist  - Needs OFV follow up in 3 months after repeat CT  - EGD biopsies pending - but at this point, will start gluten free diet    Also on omeprazole for GERD and large hiatal hernia noted on EGD     - GERD lifestyle modifications discussed  - Consider surgical referral if symptoms not controlled w gerd lifestyle mod and omeprazole

## 2023-08-16 ENCOUNTER — HOSPITAL ENCOUNTER (OUTPATIENT)
Dept: CT IMAGING | Facility: HOSPITAL | Age: 28
Discharge: HOME | End: 2023-08-16
Attending: INTERNAL MEDICINE
Payer: OTHER GOVERNMENT

## 2023-08-16 DIAGNOSIS — R93.5 ABNORMAL CT OF THE ABDOMEN: ICD-10-CM

## 2023-08-16 PROCEDURE — 74177 CT ABD & PELVIS W/CONTRAST: CPT

## 2023-08-16 PROCEDURE — G1004 CDSM NDSC: HCPCS

## 2023-08-16 RX ADMIN — IOHEXOL 85 ML: 350 INJECTION, SOLUTION INTRAVENOUS at 15:29

## 2023-08-28 ENCOUNTER — TELEPHONE (OUTPATIENT)
Dept: OTHER | Facility: OTHER | Age: 28
End: 2023-08-28

## 2023-08-28 NOTE — TELEPHONE ENCOUNTER
Pt would like to schedule an appointment to go over his CT scan results     Please give him a call back

## 2023-09-13 ENCOUNTER — OFFICE VISIT (OUTPATIENT)
Age: 28
End: 2023-09-13
Payer: OTHER GOVERNMENT

## 2023-09-13 VITALS
HEIGHT: 73 IN | WEIGHT: 256 LBS | SYSTOLIC BLOOD PRESSURE: 120 MMHG | DIASTOLIC BLOOD PRESSURE: 82 MMHG | BODY MASS INDEX: 33.93 KG/M2

## 2023-09-13 DIAGNOSIS — K21.9 GASTROESOPHAGEAL REFLUX DISEASE, UNSPECIFIED WHETHER ESOPHAGITIS PRESENT: ICD-10-CM

## 2023-09-13 DIAGNOSIS — K90.0 CELIAC DISEASE: Primary | ICD-10-CM

## 2023-09-13 DIAGNOSIS — R93.5 ABNORMAL CT OF THE ABDOMEN: ICD-10-CM

## 2023-09-13 PROCEDURE — 99204 OFFICE O/P NEW MOD 45 MIN: CPT | Performed by: PHYSICIAN ASSISTANT

## 2023-09-13 NOTE — PATIENT INSTRUCTIONS
Reviewed anti-reflux measures:  Eat smaller more frequent meals  Wait 3 hours between eating and laying down  Raise the head of the bed six inches or 30 degrees  Discussed foods to avoid: spicy foods, fatty foods, hot dogs, ribs, sausages, cream, citrus fruit juices, garlic/onion, coffee/tea, tomato-based foods, chocolate, spearmint, peppermint, carbonated beverages (fizzy drinks), alcohol      Good foods for reflux:  High-fiber foods  Fibrous foods make you feel full so you're less likely to overeat, which may contribute to heartburn. So, load up on healthy fiber from these foods:    -Whole grains such as oatmeal, couscous and brown rice. -Root vegetables such as sweet potatoes, carrots and beets. -Green vegetables such as asparagus, broccoli and green beans. Alkaline foods  Foods fall somewhere along the pH scale (an indicator of acid levels). Those that have a low pH are acidic and more likely to cause reflux. Those with higher pH are alkaline and can help offset strong stomach acid. Alkaline foods include:    -Bananas  -Melons  -Cauliflower  -Fennel  -Nuts    Watery foods  Eating foods that contain a lot of water can dilute and weaken stomach acid. Choose foods such as:    -Celery  -Cucumber  -Lettuce  -Watermelon  -Broth-based soups  -Herbal tea         DAIRY-FREE DIET    Lactose is the sugar that is found naturally in milk and milk products, as well as foods with ingredients such as milk or whey. In order for the body to use lactose, it has to break it down using an enzyme called lactase. Lactose intolerance is a condition that occurs when a person does not produce enough lactase to break down the lactose in food. Symptoms of lactose intolerance include:    Abdominal cramping  Bloating  Gas  Diarrhea    These symptoms may occur shortly after eating foods that contain lactose, but sometimes will not occur until hours later.  This is because people vary in the amount of lactose they can tolerate. Avoiding Lactose    There are many different words that are used to describe the different forms that lactose may come in. Read food labels, and stay away from foods with any of the following ingredients:    Milk  Skim milk powder  Skim milk solids  Lactose  Whey  Caseinate  Curd    Reading food/nutrition labels is a very important habit to get into when looking for foods that do not contain lactose. Lactose can come in many "hidden" forms, and even products that do not appear to have milk included, may in fact have a milk product as an ingredient.     Lactose-Free Foods    Following are lists of lactose-free foods, and foods to avoid:    Food Group     Beverages  ENJOY: Teas, regular iced tea, regular carbonated beverages, soy milk, cocoa powder, Nestle's Quik   AVOID: Milk (all types), powdered milk, sweetened/condensed milk, instant hot cocoa, instant iced tea, Ovaltine, chocolate drink mixes, cream, half-n-half and diet soda    Breads/Cereals/Crackers   ENJOY: [place] bread, [place] bread, Episcopal rye bread, Terry bread, antonio crackers, soda crackers, Ritz crackers, hot or cold cereals without added milk solids (read the label)   AVOID: Breads/rolls containing milk, prepared baking mixes (muffins, biscuits, pancakes, etc.), Zwieback, corn nuts, instant cereal with added milk solids    Potatoes and Starches   ENJOY: Items prepared without milk or milk products: macaroni, noodles, rice, spaghetti, white/sweet potatoes   AVOID: Products with milk added, such as instant potatoes, frozen [place] fries, Scalloped/au gratin potatoes, macaroni and cheese mixes    Vegetables   ENJOY: Fresh, frozen, and canned vegetables without added milk products   AVOID: Creamed or breaded vegetables or vegetables with margarine added    Fruit   ENJOY: Fresh, canned or frozen fruit not processed with milk/milk products   AVOID: Any canned or frozen fruit processed with milk or milk products    Meat and Meat Substitutes   ENJOY: Plain meat, fish, poultry, eggs, Kosher prepared meat products, soybean meat substitutes, dried peas, beans, lentils, and nuts   AVOID: Breaded or creamed eggs, fish, meat or poultry, luncheon meats, sausage, hot dogs containing cheese or cheese products    Fats and Oils   ENJOY: Emerson, shortening, Miracle Whip, milk-free margarine, diet imitation margarine, salad dressings without milk products, vegetable oils, olives, mayonnaise, Coffee Rich and Rich's Whipped Topping   AVOID: Sour cream, cream cheese, chip dips, sauces and salad dressings made with milk products and peanut butter with added milk solids    Soups/Combination Foods   ENJOY: Bouillon, broth, vegetable soups, clear, soups, consommes, homemade soups made with allowed ingredients   AVOID: Chowders, ream soups, canned and dehydrated soups containing milk products    Seasonings   ENJOY: Pure monosodium glutamate (msg), soy sauce, carob powder, olives, gravy made with water, Baker's cocoa, pure seasonings and spices, sugar, honey, corn syrup, jam, jelly, marmalade, and molasses   AVOID: Condiments with milk solids or lactose added    Desserts   ENJOY: Valarie's, homemade cookies, cakes or pies made from allowed ingredients, tofu desserts, pure-sugar candies, marshmallows, and gelatin   AVOID: Desserts prepared with milk/milk products, pudding, sherbet, ice cream, custard, frozen yogurt, toffee, peppermint, butterscotch, chocolate, caramels, reduced-calorie desserts made with sugar substitute, or chewing gum made with lactose.

## 2023-10-22 DIAGNOSIS — K21.9 GASTROESOPHAGEAL REFLUX DISEASE, UNSPECIFIED WHETHER ESOPHAGITIS PRESENT: ICD-10-CM

## 2023-10-23 RX ORDER — OMEPRAZOLE 40 MG/1
40 CAPSULE, DELAYED RELEASE ORAL DAILY
Qty: 30 CAPSULE | Refills: 5 | Status: SHIPPED | OUTPATIENT
Start: 2023-10-23

## 2023-11-24 ENCOUNTER — OFFICE VISIT (OUTPATIENT)
Dept: GASTROENTEROLOGY | Facility: CLINIC | Age: 28
End: 2023-11-24
Payer: OTHER GOVERNMENT

## 2023-11-24 VITALS
HEIGHT: 73 IN | SYSTOLIC BLOOD PRESSURE: 138 MMHG | WEIGHT: 261.4 LBS | DIASTOLIC BLOOD PRESSURE: 86 MMHG | BODY MASS INDEX: 34.64 KG/M2

## 2023-11-24 DIAGNOSIS — K90.0 CELIAC DISEASE: ICD-10-CM

## 2023-11-24 DIAGNOSIS — K21.9 GASTROESOPHAGEAL REFLUX DISEASE, UNSPECIFIED WHETHER ESOPHAGITIS PRESENT: Primary | ICD-10-CM

## 2023-11-24 DIAGNOSIS — F41.9 ANXIETY: ICD-10-CM

## 2023-11-24 DIAGNOSIS — R93.5 ABNORMAL CT OF THE ABDOMEN: ICD-10-CM

## 2023-11-24 PROCEDURE — 99214 OFFICE O/P EST MOD 30 MIN: CPT | Performed by: PHYSICIAN ASSISTANT

## 2023-11-24 RX ORDER — ESOMEPRAZOLE MAGNESIUM 40 MG/1
40 CAPSULE, DELAYED RELEASE ORAL EVERY MORNING
Qty: 90 CAPSULE | Refills: 3 | Status: SHIPPED | OUTPATIENT
Start: 2023-11-24

## 2023-11-24 NOTE — PROGRESS NOTES
[facility] - Outpatient Follow-up Note  Khushi Pickens 33 y.o. male MRN: [id number]  Encounter: [id number]    ASSESSMENT AND PLAN:    1. Gastroesophageal reflux disease, unspecified whether esophagitis present  Poorly controlled  D/c omeprazole due to reported side effects (brain fog, impaired memory)  START Nexium 40 mg QAM  Continue diet/lifestyle modifications, minimizing EtOH consumption and carbonated beverages  Follow-up in 3 mos  If sx significantly improved, will try reducing Nexium to 20 mg QAM  - esomeprazole (NexIUM) 40 MG capsule; Take 1 capsule (40 mg total) by mouth every morning Take 30 minutes before your first meal of the day  Dispense: 90 capsule; Refill: 3    2. Celiac disease  Recent dx 5/2023  Continue strict gluten-free diet    3. Abnormal CT of the abdomen  Decreased size of mesenteric lymphadenopathy on CT A/P 8/2023 from prior scan 12/2022  Repeat CT A/P in 1 year (8/2024) with OV to follow    4. Anxiety  Chronic, poorly controlled  Primarily health-related, improves with treatment of underlying health conditions     Return in about 3 months (around 2/15/2024) for with me, follow-up.  ______________________________________________________________________    Chief Complaint   Patient presents with    Follow-up     Pt is experiencing pain in his upper abdomen on L side and he is burping a lot. He feels pain and heat in his neck and back of head when he experiences heartburn symptoms. Pt would also like to discuss medications. Omeprazole does not agree with him, makes him feel foggy and forgetful and famotidine did not seem to help with symptoms. HPI:   Accompanied by self and history is obtained from self. Patient is a 33 y.o. male with a significant PMH of Celiac disease presenting for follow up regarding GERD.     HPI    GERD  Formerly well controlled on omeprazole 40 mg QAM but he stopped taking this 3-4 weeks ago because he felt it made him "foggy and forgetful"  -sx resolved after he stopped taking this medication  Famotidine ineffective, has been taking this since stopping the omeprazole  Sx are worse after drinking alcohol - has cut back a lot & noticed improvement  Has decreased carbonated beverages as well    Anxiety  Poorly controlled  Currently manages with working out    Workup to date: +Celiac serologies  EGD: 5/12/2023: bx +Celiac  Colonoscopy: none  Recent GI imaging: CT A/P 8/16/2023: "IMPRESSION:     1. Multiple prominent mesenteric root lymph nodes. Few of the index lymph nodes as discussed above are mildly decreased in size in the short axis when compared to the previous study. If indicated clinically a follow-up assessment may be obtained in 6-12   months for reassessment  2. Diverticulosis without diverticulitis."    Review of Systems   Constitutional:  Negative for appetite change, chills, fever and unexpected weight change. HENT:  Negative for dental problem, mouth sores, sore throat, trouble swallowing and voice change. Respiratory:  Negative for cough and choking. Cardiovascular:  Negative for chest pain and leg swelling. Gastrointestinal:  Positive for abdominal pain (heartburn, epigastric pain). Negative for abdominal distention, anal bleeding, blood in stool, constipation, diarrhea, nausea, rectal pain and vomiting. Excessive flatulence, occasional bloating   Skin:  Negative for pallor and rash. Neurological:  Negative for weakness, light-headedness and headaches. Psychiatric/Behavioral:  Positive for sleep disturbance. Negative for confusion. The patient is nervous/anxious.         Historical Information   Past Medical History:   Diagnosis Date    Anxiety     Celiac disease 5/18/2023    GERD (gastroesophageal reflux disease)      Past Surgical History:   Procedure Laterality Date    EGD      WISDOM TOOTH EXTRACTION       Social History     Substance and Sexual Activity   Alcohol Use Yes    Alcohol/week: 30.0 standard drinks of alcohol    Types: 30 Cans of beer per week    Comment: weekends     Social History     Substance and Sexual Activity   Drug Use Never     Social History     Tobacco Use   Smoking Status Former    Types: Cigarettes    Quit date:     Years since quittin.8    Passive exposure: Past   Smokeless Tobacco Former     Family History   Problem Relation Age of Onset    No Known Problems Mother     Cancer Father     No Known Problems Brother     Colon cancer Neg Hx     Colon polyps Neg Hx          Current Outpatient Medications:     esomeprazole (NexIUM) 40 MG capsule  No Known Allergies  Reviewed medications and allergies and updated as indicated    PHYSICAL EXAM:    Blood pressure 138/86, height 6' 1" (1.854 m), weight 119 kg (261 lb 6.4 oz). Body mass index is 34.49 kg/m². Physical Exam  Vitals and nursing note reviewed. Constitutional:       General: He is not in acute distress. Appearance: He is not toxic-appearing. HENT:      Head: Normocephalic and atraumatic. Mouth/Throat:      Mouth: Mucous membranes are moist.      Pharynx: Oropharynx is clear. Eyes:      General: No scleral icterus. Extraocular Movements: Extraocular movements intact. Neck:      Trachea: Phonation normal.   Cardiovascular:      Comments: Appears well perfused  Pulmonary:      Effort: Pulmonary effort is normal. No respiratory distress. Musculoskeletal:      Cervical back: Neck supple. Comments: Moving all 4 extremities spontaneously   Skin:     General: Skin is warm and dry. Capillary Refill: Capillary refill takes less than 2 seconds. Coloration: Skin is not jaundiced or pale. Neurological:      General: No focal deficit present. Mental Status: He is alert and oriented to person, place, and time. Psychiatric:         Mood and Affect: Mood is anxious (congruent affect). Behavior: Behavior normal. Behavior is cooperative. Thought Content:  Thought content normal.         Lab Results:   Lab Results   Component Value Date    WBC 6.63 12/31/2022    HGB 15.1 12/31/2022    MCV 91 12/31/2022     12/31/2022     Lab Results   Component Value Date    K 4.2 05/15/2023     05/15/2023    CO2 31 05/15/2023    BUN 10 05/15/2023    CREATININE 0.84 05/15/2023    GLUF 90 05/15/2023    CALCIUM 9.2 05/15/2023    AST 32 12/31/2022    ALT 45 12/31/2022    ALKPHOS 61 12/31/2022    EGFR 119 05/15/2023     Lab Results   Component Value Date    LIPASE 85 12/31/2022       Radiology Results:   No results found. Patient expressed understanding and had all questions and concerns addressed. Shania Borrero PA-C  11/24/23  2:51 PM    This chart was completed in part utilizing Track speech voice recognition software. Random word insertions, pronoun errors, and incomplete sentences are an occasional consequence of this system due to software limitations, and ambient noise. Any questions or concerns about the content, text, or information contained within the body of this dictation should be directly addressed to the provider for clarification.

## 2024-02-05 ENCOUNTER — TELEPHONE (OUTPATIENT)
Age: 29
End: 2024-02-05

## 2024-02-05 NOTE — TELEPHONE ENCOUNTER
Caller: tommy Mera    Doctor: n/a    Reason for call: if pt calls back, the call was dropped    Call back#: n/a

## 2024-02-15 ENCOUNTER — TELEPHONE (OUTPATIENT)
Age: 29
End: 2024-02-15

## 2024-02-15 NOTE — TELEPHONE ENCOUNTER
2/14/2024 - [facility] regarding No Show. Told patient to call back a reschedule if needed. BRENNEN

## 2024-02-21 NOTE — PROGRESS NOTES
[facility] ([facility]  Plastic Surgery  Operative Note    SUMMARY     Date of Procedure: 2/21/2024     Location:  [facility]    Procedure(s): Procedure(s) (LRB):  MASTECTOMY (Left)  BIOPSY, LYMPH NODE, SENTINEL (Left)  INJECTION, FOR SENTINEL NODE IDENTIFICATION (Left)  MASTECTOMY, WITH SENTINEL NODE BIOPSY AND AXILLARY LYMPHADENECTOMY (Left)    Insertion of left breast tissue expander  ICG angiography to assess flap perfusion    Surgeon(s) and Role:  Panel 1:     * Ivy Posada MD - Primary  Panel 2:     * Mehran Street DO - Primary    Assistant: OSVALDO Graham and Mariano Dominguez MD, Fellow    Pre-Operative Diagnosis: Malignant neoplasm of upper-inner quadrant of left female breast, unspecified estrogen receptor status [C50.212]    Post-Operative Diagnosis: same    Anesthesia: General    Indications for Procedure:  34-year-old woman with left breast cancer.  She decided against neoadjuvant chemotherapy.  She underwent left mastectomy ultimately implant for autologous reconstruction.  It was likely that she would need adjuvant radiotherapy so the plan was made for bridge tissue expander to later autologous reconstruction    Operative Findings (including complications, if any):   Left breast mastectomy weight 778 g  Placement of 600 cc textured Artoura high-profile tissue expander    Description of Procedures:  The patient was seen in the preoperative holding area and her breasts were marked.  Surgical and photographic consents were signed at her preoperative visit.  Incisions were discussed preoperatively with Ivy Posada MD and we  decided upon vertical incision at 6:00 a.m..  The patient was taken to the operating room.  General anesthesia was induced.  The breasts and axilla were prepped and draped the usual sterile fashion.  Please see Ivy Posada MD operative note for their portion of the procedure.    When I entered the operating room the left nipple sparing mastectomy and  [facility] - Outpatient Follow-up Note  Lynn Burr 33 y.o. male MRN: [id number]  Encounter: [id number]    ASSESSMENT AND PLAN:    1. Celiac disease  · Recent dx 5/2023  · Asymptomatic since strict gluten free diet  · Recommend trial of dairy-free diet for intermittent epigastric pain symptoms that seem to be correlated with dairy dietary intake, per historical points today  · Follow-up CT 8/2024 with office visit to follow  - CT abdomen pelvis w contrast; Future    2. Abnormal CT of the abdomen  · Decreased size of mesenteric lymphadenopathy on CT A/P 8/2023 from prior scan 12/2022  · Repeat CT A/P in 1 year (8/2024) with OV to follow  - CT abdomen pelvis w contrast; Future    3. Gastroesophageal reflux disease, unspecified whether esophagitis present  · Well controlled on omeprazole 40 mg QD  · Recommend diet/lifestyle modifications; handouts provided today. Recommend cutting out alcohol - definitely cut out carbonated beverages      Return in about 1 year (around 9/13/2024) for to follow repeat CT scan 8/2024.  ______________________________________________________________________    Chief Complaint   Patient presents with   • Follow-up     CAT scan a month ago and had EGD in May       HPI:   Accompanied by self and history is obtained from self. Patient is a 33 y.o. male with a significant PMH of recent Celiac diagnosis, GERD presenting for follow up regarding Celiac disease. HPI    Celiac  Been GF since May 2023, few accidental gluten ingestions  Continues to have intermittent burping, bloating  -has noticed this is worse w/ milk in coffee, milk w/ cereal    Workup to date: +Celiac serologies  EGD: 5/12/2023, bx +Celiac  Colonoscopy: none  Recent GI imaging: CT A/P as noted below    Review of Systems   Constitutional: Negative for appetite change, chills, fever and unexpected weight change.    HENT: Negative for dental problem, mouth sores, sore throat, trouble axillary lymph node dissection was complete.  Next the skin was re-prepped with chlorhexidine and new drapes were laid down.  The left breast pockets were checked for hemostasis.  By the base width was measured and 14 cm was thought to be an appropriate sized implant.    A 600 cc textured Artoura high-profile tissue expander was opened on the back table and allowed to soak antibiotic solution.     20cc of exparel was diluted with 20cc 0.25% marcaine.  Intercostal rib blocks were performed as well as injection of the JAIRO drain sites and the PEC1 plane under the pec major muscle.  The mastectomy pocket(s) were irrigated with dilute betadine solution followed by vashe.  Again the wound was made hemostatic using the bovie.   15 Latvian lisa drain(s) were placed and brought out through the anterior axillary line. The drains were sewn in using a 3-0 nylon suture.    The tissue expander was initially deflated    The tissue expander were sewn in to place on the chest wall using 2-0 PDS sutures with the knots buried under the tabs.  All 3 tabs tabs were used  TXA solution was used to irrigate the pocket to dwell during the ICG angiography  The mastectomy utilized a vertical pattern pattern skin incision. The incision was temporaily stapled close.  Two hundred roughly cc of air was added to the expander.    ICG angiography was performed using the Vision sense machine. 4cc of reconstituted ICG was injected by anesthesia.  It was initially slow filling of the mastectomy flap but after 90 seconds with air in the tissue expander the mastectomy skin was well-perfused    Skin was closed with buried 3-0 Monocryl and running 3-0 Stratafix    The wound was dressed with Dermabond, ABD pads and a post op bra.  JAIRO drain sites were dressed with Tegaderm CHG drain dressings.  Patient tolerated the procedure well and was taken to the recovery room in stable condition.      Significant Surgical Tasks Conducted by the Assistant(s), if  swallowing and voice change. Respiratory: Negative for cough and choking. Cardiovascular: Negative for chest pain and leg swelling. Gastrointestinal: Positive for abdominal distention and abdominal pain. Negative for anal bleeding, blood in stool, constipation, diarrhea, nausea, rectal pain and vomiting. +Bloating, +Burping   Skin: Negative for pallor and rash. Neurological: Negative for weakness, light-headedness and headaches. Psychiatric/Behavioral: Negative for confusion and sleep disturbance. The patient is not nervous/anxious. Historical Information   Past Medical History:   Diagnosis Date   • Anxiety    • Celiac disease 2023   • GERD (gastroesophageal reflux disease)      Past Surgical History:   Procedure Laterality Date   • EGD     • WISDOM TOOTH EXTRACTION       Social History     Substance and Sexual Activity   Alcohol Use Yes   • Alcohol/week: 30.0 standard drinks of alcohol   • Types: 30 Cans of beer per week    Comment: weekends     Social History     Substance and Sexual Activity   Drug Use Never     Social History     Tobacco Use   Smoking Status Former   • Types: Cigarettes   • Quit date:    • Years since quittin.6   Smokeless Tobacco Former     Family History   Problem Relation Age of Onset   • No Known Problems Mother    • Cancer Father    • No Known Problems Brother    • Colon cancer Neg Hx    • Colon polyps Neg Hx          Current Outpatient Medications:   •  omeprazole (PriLOSEC) 40 MG capsule  No Known Allergies  Reviewed medications and allergies and updated as indicated    PHYSICAL EXAM:    Blood pressure 120/82, height 6' 1" (1.854 m), weight 116 kg (256 lb). Body mass index is 33.78 kg/m². Physical Exam  Vitals and nursing note reviewed. Constitutional:       General: He is not in acute distress. Appearance: He is not toxic-appearing. HENT:      Head: Normocephalic and atraumatic.       Mouth/Throat:      Mouth: Mucous membranes are moist. Applicable: The indicated resident served as first assistant for this procedure.    Estimated Blood Loss (EBL): 10mL           Implants:   Implant Name Type Inv. Item Serial No.  Lot No. LRB No. Used Action   EXPANDER ARTOURA 600 [id number] - [id number]  EXPANDER ARTOURA 600 [id number]  [employer] [facility] [id number] Left 1 Implanted       Specimens:   Specimen (24h ago, onward)       Start     Ordered    02/21/24 1042  Specimen to Pathology, Surgery Breast  Once        Comments: Pre-op Diagnosis: Malignant neoplasm of upper-inner quadrant of left female breast, unspecified estrogen receptor status [C50.212]Procedure(s):MASTECTOMYBIOPSY, LYMPH NODE, SENTINELINJECTION, FOR SENTINEL NODE IDENTIFICATIONMASTECTOMY, WITH SENTINEL NODE BIOPSY AND AXILLARY LYMPHADENECTOMYINSERTION, TISSUE EXPANDER, BREAST /  UNLATERAL LEFT TISSUE EXPANDER Number of specimens: 3Name of specimens: 1. Left breast ( long stitch: LATERAL, short stitch: SUPERIOR, looped: subareolar)2. Left Axillary [place] Lymph node hot and clipped at 5483. Left Axillary [place] Lymph node hot 1174.     References:    Click here for ordering Quick Tip   Question Answer Comment   Procedure Type: Breast    Specimen Class: Known or suspected malignancy    Which provider would you like to cc? CHANO AYALA    Release to patient Immediate        02/21/24 1102    Pending  Specimen to Pathology, Surgery Breast  Once        Comments: Pre-op Diagnosis: Malignant neoplasm of upper-inner quadrant of left female breast, unspecified estrogen receptor status [C50.212]Procedure(s):MASTECTOMYBIOPSY, LYMPH NODE, SENTINELINJECTION, FOR SENTINEL NODE IDENTIFICATIONMASTECTOMY, WITH SENTINEL NODE BIOPSY AND AXILLARY LYMPHADENECTOMYINSERTION, TISSUE EXPANDER, BREAST /  UNLATERAL LEFT TISSUE EXPANDER Number of specimens: 4Name of specimens: 1. Left breast ( long stitch: LATERAL, short stitch: SUPERIOR, looped: subareolar)2. Left Axillary [place] Lymph node hot and clipped at 5483.  Pharynx: Oropharynx is clear. Eyes:      General: No scleral icterus. Extraocular Movements: Extraocular movements intact. Neck:      Trachea: Phonation normal.   Cardiovascular:      Comments: Appears well perfused  Pulmonary:      Effort: Pulmonary effort is normal. No respiratory distress. Abdominal:      General: Bowel sounds are normal. There is no distension or abdominal bruit. Palpations: Abdomen is soft. There is no hepatomegaly or splenomegaly. Tenderness: There is abdominal tenderness (mild) in the epigastric area. There is no guarding or rebound. Musculoskeletal:      Cervical back: Neck supple. Comments: Moving all 4 extremities spontaneously   Skin:     General: Skin is warm and dry. Capillary Refill: Capillary refill takes less than 2 seconds. Coloration: Skin is not jaundiced or pale. Neurological:      General: No focal deficit present. Mental Status: He is alert and oriented to person, place, and time. Psychiatric:         Behavior: Behavior normal. Behavior is cooperative. Thought Content: Thought content normal.         Lab Results:   Lab Results   Component Value Date    WBC 6.63 12/31/2022    HGB 15.1 12/31/2022    MCV 91 12/31/2022     12/31/2022     Lab Results   Component Value Date    K 4.2 05/15/2023     05/15/2023    CO2 31 05/15/2023    BUN 10 05/15/2023    CREATININE 0.84 05/15/2023    GLUF 90 05/15/2023    CALCIUM 9.2 05/15/2023    AST 32 12/31/2022    ALT 45 12/31/2022    ALKPHOS 61 12/31/2022    EGFR 119 05/15/2023     Lab Results   Component Value Date    LIPASE 85 12/31/2022       Radiology Results:   CT abdomen pelvis w contrast    Result Date: 8/25/2023  Narrative: CT ABDOMEN AND PELVIS WITH IV CONTRAST INDICATION:   R93.5: Abnormal findings on diagnostic imaging of other abdominal regions, including retroperitoneum. COMPARISON: 12/31/2022 TECHNIQUE:  CT examination of the abdomen and pelvis was performed. Dual energy CT scan technique (DECT) was employed. Multiplanar 2D reformatted images were created from the source data. This examination, like all CT scans performed in the [facility], was performed utilizing techniques to minimize radiation dose exposure, including the use of iterative reconstruction and automated exposure control. Radiation dose length product (DLP) for this visit:  1246 mGy-cm IV Contrast:  85 mL of iohexol (OMNIPAQUE) Enteric Contrast:  Enteric contrast was not administered. FINDINGS: ABDOMEN LOWER CHEST:  No clinically significant abnormality identified in the visualized lower chest. LIVER/BILIARY TREE:  Unremarkable. GALLBLADDER:  No calcified gallstones. No pericholecystic inflammatory change. SPLEEN:  Unremarkable. PANCREAS:  Unremarkable. ADRENAL GLANDS:  Unremarkable. KIDNEYS/URETERS:  Unremarkable. No hydronephrosis. STOMACH AND BOWEL:  There is colonic diverticulosis without evidence of acute diverticulitis. APPENDIX:  No findings to suggest appendicitis. ABDOMINOPELVIC CAVITY:  No ascites. No pneumoperitoneum. Multiple scattered mesenteric root lymph nodes in the abdomen largest measuring 10 mm in short axis. Retroperitoneal lymph nodes are present without enlargement. 2 index lymph nodes on axial series 301 image #122 measuring 6 and 7 mm in short axis, believed to correspond to lymph nodes identified on the prior study on series 2 image #58 which measured 10 mm in short axis. Left mid abdominal mesenteric lymph node measuring 8 mm in short axis on series 301 image 105 may correspond to a lymph node measured maximally at 14 mm on series 2 image #45. VESSELS:  Unremarkable for patient's age. PELVIS REPRODUCTIVE ORGANS:  Unremarkable for patient's age. URINARY BLADDER:  Unremarkable. ABDOMINAL WALL/INGUINAL REGIONS:  Unremarkable. OSSEOUS STRUCTURES:  No acute fracture or destructive osseous lesion. Impression: 1. Multiple prominent mesenteric root lymph nodes.  Few Left Axillary [place] Lymph node hot 1174. Left Axillary Contents     References:    Click here for ordering Quick Tip   Question Answer Comment   Procedure Type: Breast    Specimen Class: Known or suspected malignancy    Which provider would you like to cc? CHANO AYALA    Release to patient Immediate        Pending                            Condition: Good    Disposition: PACU - hemodynamically stable., the patient had significant anxiety concerns about going home.  She will be kept overnight for extended recovery for postoperative pain control    Attestation: I was present and scrubbed for the entire procedure.     of the index lymph nodes as discussed above are mildly decreased in size in the short axis when compared to the previous study. If indicated clinically a follow-up assessment may be obtained in 6-12 months for reassessment 2. Diverticulosis without diverticulitis. Workstation performed: [id number]         Patient expressed understanding and had all questions and concerns addressed. Sarah Scherer PA-C  09/13/23  4:11 PM    This chart was completed in part utilizing [employer] speech voice recognition software. Random word insertions, pronoun errors, and incomplete sentences are an occasional consequence of this system due to software limitations, and ambient noise. Any questions or concerns about the content, text, or information contained within the body of this dictation should be directly addressed to the provider for clarification.

## 2024-08-02 ENCOUNTER — TELEPHONE (OUTPATIENT)
Dept: GASTROENTEROLOGY | Facility: CLINIC | Age: 29
End: 2024-08-02

## 2024-08-02 DIAGNOSIS — R93.5 ABNORMAL CT OF THE ABDOMEN: Primary | ICD-10-CM

## 2024-09-24 ENCOUNTER — OFFICE VISIT (OUTPATIENT)
Dept: OBGYN CLINIC | Facility: CLINIC | Age: 29
End: 2024-09-24
Payer: OTHER GOVERNMENT

## 2024-09-24 ENCOUNTER — APPOINTMENT (OUTPATIENT)
Dept: RADIOLOGY | Facility: CLINIC | Age: 29
End: 2024-09-24
Payer: OTHER GOVERNMENT

## 2024-09-24 VITALS
SYSTOLIC BLOOD PRESSURE: 122 MMHG | HEIGHT: 73 IN | WEIGHT: 275 LBS | BODY MASS INDEX: 36.45 KG/M2 | DIASTOLIC BLOOD PRESSURE: 80 MMHG

## 2024-09-24 DIAGNOSIS — M25.512 CHRONIC LEFT SHOULDER PAIN: Primary | ICD-10-CM

## 2024-09-24 DIAGNOSIS — M25.511 ACUTE PAIN OF RIGHT SHOULDER: ICD-10-CM

## 2024-09-24 DIAGNOSIS — G89.29 CHRONIC LEFT SHOULDER PAIN: Primary | ICD-10-CM

## 2024-09-24 PROCEDURE — 73030 X-RAY EXAM OF SHOULDER: CPT

## 2024-09-24 PROCEDURE — 99203 OFFICE O/P NEW LOW 30 MIN: CPT | Performed by: ORTHOPAEDIC SURGERY

## 2024-09-24 NOTE — PROGRESS NOTES
Assessment:     1. Chronic left shoulder pain        Plan:     Problem List Items Addressed This Visit          Surgery/Wound/Pain    Chronic left shoulder pain - Primary     Findings consistent with chronic left shoulder pain, concern for possible labral tear. Findings and treatment options were discussed with the patient. X-rays were reviewed with him.  Recommend starting with formal physical therapy for strengthening and muscle balancing.  He is to avoid heavy lifting at the gym at this time along with lifting away from his body and overhead.  Recommend use of oral or topical NSAIDs as needed for pain.  He was given a note for the [employer] to remain on light duty at this time.  He was advised to  a copy of the disc of his previous MRI from 6 months ago and bring into the office so it can be uploaded into our system.  Discussed that if he does not have improvement over the next 4 to 6 weeks, we will send him for an MR arthrogram of the left shoulder.  He can call us if he is not feeling improvement so we can order it prior to his next appointment. All patient's questions were answered to his satisfaction.  This note is created using dictation transcription.  It may contain typographical errors, grammatical errors, improperly dictated words, background noise and other errors.         Relevant Orders    XR shoulder 2+ vw left    Ambulatory Referral to Physical Therapy      Subjective:     Patient ID: Walt Arshad is a 29 y.o. male.  Chief Complaint:  This is a right-hand-dominant 29-year-old white male here for evaluation of his left shoulder.  He had an initial injury in 2015 while in the .  He states that his arm got pulled behind with it extended overhead.  He felt a pop followed by immediate pain in the left shoulder.  He denies his shoulder dislocating at the time.  He states it took a few weeks for the pain to resolve.  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  The pain did somewhat improve with rest.  He states for the past 1.5 years the pain has been constant and progressively worsening.  He denies any new injury recently.  The pain is worse with any reaching overhead and behind him.  He has increased pain with incline bench press and  press.  He denies any feeling of instability in his shoulder.  He feels like he is losing range of motion.  He does have pain that extends up into his neck at times.  No numbness or tingling down the left upper extremity.  He was seen by another orthopedic provider at [facility] who ordered an MRI.  he did not bring the MRI today but states he was told he had bursitis, tendinitis and osteoarthritis.  He was given a cortisone injection that gave a few days of improvement then the pain returned.  No other treatment.    Allergy:  No Known Allergies  Medications:  all current active meds have been reviewed  Past Medical History:  Past Medical History:   Diagnosis Date    Anxiety     Celiac disease 2023    GERD (gastroesophageal reflux disease)      Past Surgical History:  Past Surgical History:   Procedure Laterality Date    EGD      WISDOM TOOTH EXTRACTION       Family History:  Family History   Problem Relation Age of Onset    No Known Problems Mother     Cancer Father     No Known Problems Brother     Colon cancer Neg Hx     Colon polyps Neg Hx      Social History:  Social History     Substance and Sexual Activity   Alcohol Use Yes    Alcohol/week: 30.0 standard drinks of alcohol    Types: 30 Cans of beer per week    Comment: weekends     Social History     Substance and Sexual Activity   Drug Use Never     Social History     Tobacco Use   Smoking Status Former    Current packs/day: 0.00    Types: Cigarettes    Quit date:     Years since quittin.7    Passive exposure: Past   Smokeless Tobacco Former     Review of Systems   Constitutional: Negative.    HENT: Negative.     Eyes:  "Negative.    Respiratory: Negative.     Cardiovascular: Negative.    Gastrointestinal: Negative.    Endocrine: Negative.    Genitourinary: Negative.    Musculoskeletal:  Positive for arthralgias (left shoulder). Negative for joint swelling and neck pain.   Skin: Negative.    Allergic/Immunologic: Negative.    Neurological: Negative.  Negative for weakness and numbness.   Hematological: Negative.    Psychiatric/Behavioral: Negative.           Objective:  BP Readings from Last 1 Encounters:   09/24/24 122/80      Wt Readings from Last 1 Encounters:   09/24/24 125 kg (275 lb)      BMI:   Estimated body mass index is 36.28 kg/m² as calculated from the following:    Height as of this encounter: 6' 1\" (1.854 m).    Weight as of this encounter: 125 kg (275 lb).  BSA:   Estimated body surface area is 2.47 meters squared as calculated from the following:    Height as of this encounter: 6' 1\" (1.854 m).    Weight as of this encounter: 125 kg (275 lb).   Physical Exam  Vitals and nursing note reviewed.   Constitutional:       General: He is not in acute distress.     Appearance: Normal appearance. He is well-developed.   HENT:      Head: Normocephalic and atraumatic.      Right Ear: External ear normal.      Left Ear: External ear normal.   Eyes:      Extraocular Movements: Extraocular movements intact.      Conjunctiva/sclera: Conjunctivae normal.   Pulmonary:      Effort: Pulmonary effort is normal. No respiratory distress.   Musculoskeletal:      Cervical back: Neck supple.   Skin:     General: Skin is warm and dry.   Neurological:      Mental Status: He is alert and oriented to person, place, and time.   Psychiatric:         Mood and Affect: Mood normal.         Behavior: Behavior normal.       Left Shoulder Exam     Tenderness   Left shoulder tenderness location: parascapular.    Range of Motion   Active abduction:  normal Left shoulder active abduction: pain.  Internal rotation 0 degrees:  L5 (pain) abnormal   Internal " rotation 90 degrees:  abnormal     Muscle Strength   Abduction: 5/5   Internal rotation: 5/5   External rotation: 5/5   Biceps: 5/5     Tests   Guerra test: positive  Cross arm: negative  Impingement: positive  Drop arm: negative  Sulcus: absent    Other   Erythema: absent  Scars: absent  Sensation: normal  Pulse: present     Comments:  Positive Speeds  Negative empty can            I have personally reviewed pertinent films in PACS and my interpretation is x-rays of the left shoulder reveal no bony abnormalities.  No soft tissue calcifications.    Scribe Attestation      I,:  Tiffany Waterman PA-C am acting as a scribe while in the presence of the attending physician.:       I,:  Vaughn Jang MD personally performed the services described in this documentation    as scribed in my presence.:

## 2024-09-24 NOTE — ASSESSMENT & PLAN NOTE
Findings consistent with chronic left shoulder pain, concern for possible labral tear. Findings and treatment options were discussed with the patient. X-rays were reviewed with him.  Recommend starting with formal physical therapy for strengthening and muscle balancing.  He is to avoid heavy lifting at the gym at this time along with lifting away from his body and overhead.  Recommend use of oral or topical NSAIDs as needed for pain.  He was given a note for the [employer] to remain on light duty at this time.  He was advised to  a copy of the disc of his previous MRI from 6 months ago and bring into the office so it can be uploaded into our system.  Discussed that if he does not have improvement over the next 4 to 6 weeks, we will send him for an MR arthrogram of the left shoulder.  He can call us if he is not feeling improvement so we can order it prior to his next appointment. All patient's questions were answered to his satisfaction.  This note is created using dictation transcription.  It may contain typographical errors, grammatical errors, improperly dictated words, background noise and other errors.

## 2024-09-24 NOTE — LETTER
September 24, 2024     Patient: Walt Arshad  YOB: 1995  Date of Visit: 9/24/2024      To Whom it May Concern:    Walt Arshad is under my professional care. Walt was seen in my office on 9/24/2024. Walt can only do light duty at this time. He is being treated for left shoulder pain.    If you have any questions or concerns, please don't hesitate to call.         Sincerely,          Vaughn Jang MD        CC: No Recipients

## 2024-09-24 NOTE — LETTER
September 24, 2024     Patient: Walt Arshad  YOB: 1995  Date of Visit: 9/24/2024      To Whom it May Concern:    Walt Arshad is under my professional care. Walt was seen in my office on 9/24/2024. Walt is on light duty for the next 6 weeks.  He will be reevaluated at that time.  He is being treated for left shoulder pain.    If you have any questions or concerns, please don't hesitate to call.         Sincerely,          Vaughn Jang MD        CC: No Recipients

## 2024-10-04 ENCOUNTER — EVALUATION (OUTPATIENT)
Dept: PHYSICAL THERAPY | Facility: CLINIC | Age: 29
End: 2024-10-04
Payer: OTHER GOVERNMENT

## 2024-10-04 DIAGNOSIS — G89.29 CHRONIC LEFT SHOULDER PAIN: Primary | ICD-10-CM

## 2024-10-04 DIAGNOSIS — M25.512 CHRONIC LEFT SHOULDER PAIN: Primary | ICD-10-CM

## 2024-10-04 PROCEDURE — 97161 PT EVAL LOW COMPLEX 20 MIN: CPT

## 2024-10-04 PROCEDURE — 97110 THERAPEUTIC EXERCISES: CPT

## 2024-10-04 NOTE — PROGRESS NOTES
PT Evaluation       Today's date: 10/4/2024  Patient name: Walt Arshad  : 1995  MRN: [id number]  Referring provider: Tiffany Waterman PA-C  Dx:   Encounter Diagnosis     ICD-10-CM    1. Chronic left shoulder pain  M25.512 Ambulatory Referral to Physical Therapy    G89.29           Start Time: 1430  Stop Time: 1512  Total time in clinic (min): 42 minutes    Assessment  Impairments: abnormal or restricted ROM, activity intolerance, impaired physical strength, lacks appropriate home exercise program, pain with function, poor body mechanics, unable to perform ADL, participation limitations, activity limitations and endurance  Symptom irritability: moderate    Assessment details: Patient is a 29  year old  reporting to therapy for initial evaluation with the referring diagnosis of Chronic left shoulder pain. Pain displays deficits including decreased shoulder mobility and rom, decreased shoulder strength on mmt, ttp along medial border of scapula and upper trap, and pain with shoulder movement. Patient with noted limitations with adls such as reaching and lifting due to stated deficits. He will benefit from skilled physical therapy in order to improve his symptoms and maximize his function. Patient was educated on examination findings today and goals of therapy. Established hep with him today which he tolerated well. Negative prognostic factors include chronicity of reported symptoms.           Understanding of Dx/Px/POC: good     Prognosis: good    Goals  STG  -Patient will be IND with basic level HEP within 4 weeks in order to make improvements at home   - Patient will have a decrease in pain levels of 50%  within 4 week in order to improve quality of life  - patient will have 10 degree improvement in shoulder elevation within 4 weeks in order to improve function       LTG  -Patient will be IND with an advanced level HEP within 8 weeks in order to make improvements at home   - Patient will reach his FOTO score  "goal within 8 weeks  - patient will improve strength to wfl within 8 weeks in order to complete adls  - patient will improve shoulder rom to wfl within 8 weeks in order to complete adls  - patient will perform with adls with pain </2/10 within 8 weeks    Plan  Patient would benefit from: PT eval and skilled physical therapy  Referral necessary: No  Planned modality interventions: cryotherapy and thermotherapy: hydrocollator packs    Planned therapy interventions: IASTM, joint mobilization, manual therapy, massage, muscle pump exercises, nerve gliding, neuromuscular re-education, patient/caregiver education, postural training, strengthening, stretching, therapeutic activities, therapeutic exercise, flexibility, functional ROM exercises, ADL retraining, body mechanics training, graded activity, graded exercise, home exercise program and therapeutic training    Frequency: 2x week  Duration in weeks: 8  Plan of Care beginning date: 10/4/2024  Plan of Care expiration date: 11/29/2024  Treatment plan discussed with: patient  Plan details: Skilled PT to improve strength, ROM, flexibility, endurance, pain, and function.           Subjective Evaluation    History of Present Illness  Mechanism of injury: Patient reports to therapy with Left sided shoulder pain that he locates from the back of his shoulder blade to his lateral shoulder and bicep. He reports his pain is \"like a dull ache\" and is very uncomfortable. He reports he gets his pain intermittently. He reports when he wake sup in the morning his shoulder is pretty good. He reports when he starts doing anything activity wise he gets pain.  He does note popping in his shoulder with motion. Patient states he injured his shoulder a few times in the past with the first of which being in 2013. However he reports his pain got better until about a year and half ago in which it has been pretty consistent since then. He had a mri with [facility] in March of this past year " however he reports it was inconclusive. He also repoprts he has a cortisone injection into his shoulder in  without any relief.Pain is improved with massage or rubbing the back of his shoulder blade and warm showers. He denies any past surgeries on his shoulder or arm. He does get occasional tingling in his hand which he reports is every now and then however it generally subsides in less than a minute. Patient's goal for therapy are to are be comfortable and be able to lift weight without pain.               Recurrent probem    Quality of life: good    Patient Goals  Patient goals for therapy: increased strength, independence with ADLs/IADLs, return to sport/leisure activities, increased motion and decreased pain    Pain  Current pain ratin  At best pain ratin  At worst pain ratin  Quality: dull ache, tight and throbbing  Relieving factors: heat and rest  Aggravating factors: overhead activity and lifting  Progression: no change      Diagnostic Tests  X-ray: normal  Treatments  Previous treatment: injection treatment        Objective     Palpation   Left   Tenderness of the middle trapezius and upper trapezius.     Neurological Testing     Sensation     Shoulder   Left Shoulder   Intact: light touch    Right Shoulder   Intact: Light touch    Reflexes   Left   Biceps (C5/C6): normal (2+)  Brachioradialis (C6): normal (2+)    Right   Biceps (C5/C6): normal (2+)  Brachioradialis (C6): normal (2+)    Additional Neurological Details  Ue dermatomes/myotomes wnl     Active Range of Motion   Cervical/Thoracic Spine       Cervical    Left lateral flexion:  WFL  Right lateral flexion:  WFL  Left rotation:  WFL  Right rotation:  WFL  Left Shoulder   Flexion: 158 degrees with pain  Abduction: 141 degrees with pain  External rotation BTH: T1   Internal rotation BTB: L5 with pain    Right Shoulder   Flexion: WFL  Abduction: WFL  External rotation BTH: T1   Internal rotation BTB: T6   Mechanical  Assessment    Cervical    Seated Protrusion: sustained positions   Pain location: no change  Seated retraction: repeated movements   Pain location: no change    Thoracic      Lumbar      Strength/Myotome Testing     Left Shoulder     Planes of Motion   Flexion: 4+   Abduction: 4+   External rotation at 0°: 5   External rotation at 90°: 4+   Internal rotation at 0°: 5     Isolated Muscles   Biceps: 5   Triceps: 5     Right Shoulder     Planes of Motion   Flexion: 5   Abduction: 5   External rotation at 0°: 5   Internal rotation at 0°: 5     Isolated Muscles   Biceps: 5   Triceps: 5     Tests   Cervical     Left   Negative Spurling's Test A.     Right   Negative Spurling's Test A.     Left Shoulder   Positive empty can and painful arc.   Negative Hawkin's and Speed's.            Precautions:   Past Medical History:   Diagnosis Date    Anxiety     Celiac disease 5/18/2023    GERD (gastroesophageal reflux disease)        Manuals 10/4             PROM             L UT STM                           ULTT assess nv             Neuro Re-Ed             Tb er/ir walk outs             Row              Body blade              Wall ball circles                                                    Ther Ex             Wall slide Hep             Shoudler isometrics Hep             Dowel flex             ube                                                                 Ther Activity                                       Gait Training                                       Modalities

## 2024-10-09 ENCOUNTER — OFFICE VISIT (OUTPATIENT)
Dept: PHYSICAL THERAPY | Facility: CLINIC | Age: 29
End: 2024-10-09
Payer: OTHER GOVERNMENT

## 2024-10-09 DIAGNOSIS — G89.29 CHRONIC LEFT SHOULDER PAIN: Primary | ICD-10-CM

## 2024-10-09 DIAGNOSIS — M25.512 CHRONIC LEFT SHOULDER PAIN: Primary | ICD-10-CM

## 2024-10-09 PROCEDURE — 97110 THERAPEUTIC EXERCISES: CPT

## 2024-10-09 PROCEDURE — 97140 MANUAL THERAPY 1/> REGIONS: CPT

## 2024-10-09 PROCEDURE — 97112 NEUROMUSCULAR REEDUCATION: CPT

## 2024-10-09 NOTE — PROGRESS NOTES
"Daily Note     Today's date: 10/9/2024  Patient name: Walt Arshad  : 1995  MRN: [id number]  Referring provider: Tiffany Waterman PA-C  Dx:   Encounter Diagnosis     ICD-10-CM    1. Chronic left shoulder pain  M25.512     G89.29                      Subjective: Patient reports he tried the home exercises but forgot some of them      Objective: See treatment diary below      Assessment: Patient tolerated treatment well overall. Note (+) L median nerve tension test today. Numbness/tingling improved with manual median nerve glides. Try MDT next visit for cervical spine. Shoulder ROM improved with L shoulder PROM. Initiated exercises as noted below with good tolerance. He will benefit from skilled PT to address impairments and return to PLOF      Plan: repeated movements for cervical spine next visit. Progress shoulder ROM, gradually reincorporate exercises he would do at the gym       Precautions: none  Functional Limitations: weight lifting  Impairments: L shoulder ROM, (+) median nerve tension, L shoulder strength  Med[employer] Code: [email or web address]   POC expiration: 2024      Manuals 10/4  10/9           L shld PROM  12'           L UT STM   nv           Manual median nerve glide  2'           ULTT assess nv  (+) median nerve           Neuro Re-Ed             Tb er/ir walk outs  nv           TB row  nv           TB shld ext  nv           Body blade              Wall ball circles  nv           Quad push up plus   nv           Median nerve glide  HEP 10x                        Ther Ex             Wall slide Hep  Flex and abd 5\"x5           Shoudler iso's abd, flex, IR/ER Hep  5\"x5 ea           Dowel flex  5\"x10           ube  3'/3'           Doorway pec stretch  15\"x3           CC half kneel single arm row  66# 3x10                                     Ther Activity                                       Gait Training                                       Modalities                                            "

## 2024-10-11 ENCOUNTER — OFFICE VISIT (OUTPATIENT)
Dept: PHYSICAL THERAPY | Facility: CLINIC | Age: 29
End: 2024-10-11
Payer: OTHER GOVERNMENT

## 2024-10-11 DIAGNOSIS — G89.29 CHRONIC LEFT SHOULDER PAIN: Primary | ICD-10-CM

## 2024-10-11 DIAGNOSIS — M25.512 CHRONIC LEFT SHOULDER PAIN: Primary | ICD-10-CM

## 2024-10-11 PROCEDURE — 97110 THERAPEUTIC EXERCISES: CPT

## 2024-10-11 PROCEDURE — 97140 MANUAL THERAPY 1/> REGIONS: CPT

## 2024-10-11 NOTE — PROGRESS NOTES
"Daily Note     Today's date: 10/11/2024  Patient name: Walt Arshad  : 1995  MRN: [id number]  Referring provider: Tiffany Waterman PA-C  Dx:   Encounter Diagnosis     ICD-10-CM    1. Chronic left shoulder pain  M25.512     G89.29                      Subjective: Patient reports having a lot of tightness in the back of his shoulder along scapular region      Objective: See treatment diary below      Assessment: Patient tolerated treatment well overall. Note decreased parasthesias in LUE and improved L shoulder ROM. Fatigue after TB ER and eccentric scaption. He will benefit from skilled PT to address impairments and return to PLOF      Plan: continue progressing RTC strength and shoulder ROM       Precautions: none  Functional Limitations: weight lifting  Impairments: L shoulder ROM, (+) median nerve tension, L shoulder strength  [employer] Code: [email or web address]   POC expiration: 2024        POC Expires Reeval for Medicare to be completed Unit LImit Auth Expiration Date PT/OT/STVisit Limit   2024 By visit n/a N/a 2024 N/a    Completed on visit n/a                 Auth Status: n/a 10/4  10/9 10/11          Visit #   3          Visits remaining             Manuals             L shld PROM  12' 12'          L UT STM   nv           Manual median nerve glide  2' 2'          SOR   nv          Cervical distraction   nv          ULTT assess nv  (+) median nerve           Neuro Re-Ed             Tb ER  nv BTB 20x          TB row  nv           TB shld ext  nv           Body blade              Wall ball circles  nv           Quad push up plus   nv nv          Median nerve glide  HEP 10x 10x          Serratus flex w/ foam roll at wall   20x          Ther Ex             Wall slide Hep  Flex and abd 5\"x5 Flex and abd 5\"x10          Shoudler iso's abd, flex, IR/ER Hep  5\"x5 ea HEP          Dowel flex supine  5\"x10 5\"x10          ube  3'/3' nv          Doorway pec stretch  15\"x3 20\"x3          CC half kneel single arm " "row  66# 3x10 66# 3x10          Eccentric scaption   5# 2x10          Cross body stretch at wall   10\"x10          L Levator scap stretch   20\"x3          Cervical retraction   10x                                    Ther Activity                                       Gait Training                                       Modalities                                            "

## 2024-10-15 ENCOUNTER — OFFICE VISIT (OUTPATIENT)
Dept: PHYSICAL THERAPY | Facility: CLINIC | Age: 29
End: 2024-10-15
Payer: OTHER GOVERNMENT

## 2024-10-15 DIAGNOSIS — M25.512 CHRONIC LEFT SHOULDER PAIN: Primary | ICD-10-CM

## 2024-10-15 DIAGNOSIS — G89.29 CHRONIC LEFT SHOULDER PAIN: Primary | ICD-10-CM

## 2024-10-15 PROCEDURE — 97140 MANUAL THERAPY 1/> REGIONS: CPT

## 2024-10-15 PROCEDURE — 97112 NEUROMUSCULAR REEDUCATION: CPT

## 2024-10-15 PROCEDURE — 97110 THERAPEUTIC EXERCISES: CPT

## 2024-10-15 NOTE — PROGRESS NOTES
"Daily Note     Today's date: 10/15/2024  Patient name: Walt Arshad  : 1995  MRN: [id number]  Referring provider: Tiffany Waterman PA-C  Dx:   Encounter Diagnosis     ICD-10-CM    1. Chronic left shoulder pain  M25.512     G89.29                      Subjective: Patient reports he is sore and tight today. Noted he hasn't felt much of a difference since starting PT but has been consistent with his HEP.       Objective: See treatment diary below      Assessment: Patient tolerated treatment well overall. Improved PROM post manual therapy today which led to improved performance of TE's but overall no carry over yet with ADL's. Moderate popping with AROM and increased joint play with PROM.  He will benefit from skilled PT to address impairments and return to PLOF      Plan: continue progressing RTC strength and shoulder ROM       Precautions: none  Functional Limitations: weight lifting  Impairments: L shoulder ROM, (+) median nerve tension, L shoulder strength  [facility] [email or web address]   POC expiration: 2024        POC Expires Reeval for Medicare to be completed Unit LImit Auth Expiration Date PT/OT/STVisit Limit   2024 By visit n/a N/a 2024 N/a    Completed on visit n/a                 Auth Status: n/a 10/4  10/9 10/11 10/15         Visit #   3 4         Visits remaining             Manuals             L shld PROM  12' 12' 13'         L UT STM   nv           Manual median nerve glide  2' 2' 2'         SOR   nv nv         Cervical distraction   nv nv         ULTT assess nv  (+) median nerve           Neuro Re-Ed             Tb ER  nv BTB 20x BTB  x20         TB row  nv           TB shld ext  nv           Body blade              Wall ball circles  nv           Quad push up plus   nv nv          Median nerve glide  HEP 10x 10x manual         Serratus flex w/ foam roll at wall   20x 20x         Ther Ex             Wall slide Hep  Flex and abd 5\"x5 Flex and abd 5\"x10          Shoudler iso's abd, " "flex, IR/ER Hep  5\"x5 ea HEP          Dowel flex supine  5\"x10 5\"x10 nv         UBE  3'/3' nv 3'/3'         Doorway pec stretch  15\"x3 20\"x3 20\"x3         CC half kneel single arm row  66# 3x10 66# 3x10 66#  10x2         Eccentric scaption   5# 2x10 5#  10x2         Cross body stretch at wall   10\"x10 10\"x10         L Levator scap stretch   20\"x3 20\"x3         Cervical retraction   10x                                    Ther Activity                                       Gait Training                                       Modalities                                            "

## 2024-10-17 ENCOUNTER — OFFICE VISIT (OUTPATIENT)
Dept: PHYSICAL THERAPY | Facility: CLINIC | Age: 29
End: 2024-10-17
Payer: OTHER GOVERNMENT

## 2024-10-17 DIAGNOSIS — G89.29 CHRONIC LEFT SHOULDER PAIN: Primary | ICD-10-CM

## 2024-10-17 DIAGNOSIS — M25.512 CHRONIC LEFT SHOULDER PAIN: Primary | ICD-10-CM

## 2024-10-17 PROCEDURE — 97140 MANUAL THERAPY 1/> REGIONS: CPT

## 2024-10-17 PROCEDURE — 97112 NEUROMUSCULAR REEDUCATION: CPT

## 2024-10-17 PROCEDURE — 97110 THERAPEUTIC EXERCISES: CPT

## 2024-10-17 NOTE — PROGRESS NOTES
Daily Note     Today's date: 10/17/2024  Patient name: Walt Arshad  : 1995  MRN: [id number]  Referring provider: Tiffany Waterman PA-C  Dx:   Encounter Diagnosis     ICD-10-CM    1. Chronic left shoulder pain  M25.512     G89.29           Start Time:   Stop Time: 1900  Total time in clinic (min): 45 minutes    Subjective: Patient reports that his shoulder is getting a little better.  He states that he has more mobility in the shoulder, especially reaching behind his back.  No significant soreness after last session.      Objective: See treatment diary below      Assessment: Patient demonstrates improving L shoulder ROM this session.  Patient completed today's session without increase in pain.  UBE performed in order to improve UE muscular endurance.  Repeated cervical retractions increase tingling in L hand.  Protraction improves symptoms.  Differential diagnosis should include TOS as motions that increase thoracic outlet space improve symptoms.  Motor control deficits present.  Future sessions should continue to progress ROM exercises as able. Tolerated treatment fair. Patient would benefit from continued PT.      Plan: Continue per plan of care.  Check first rib mobility and TOS testing.     Precautions: none  Functional Limitations: weight lifting  Impairments: L shoulder ROM, (+) median nerve tension, L shoulder strength  [facility] [email or web address]   POC expiration: 2024        POC Expires Reeval for Medicare to be completed Unit LImit Auth Expiration Date PT/OT/STVisit Limit   2024 By visit n/a N/a 2024 N/a    Completed on visit n/a                 Auth Status: n/a 10/4  10/9 10/11 10/15 10/17        Visit #   3 4         Visits remaining             Manuals             L shld PROM  12' 12' 13' 5'        L UT STM   nv           Manual median nerve glide  2' 2' 2'         SOR   nv nv         Cervical distraction   nv nv         ULTT assess nv  (+) median nerve           Neuro Re-Ed      "        Tb ER  nv BTB 20x BTB  x20 BTB  x20        TB row  nv           TB shld ext  nv           Body blade              Wall ball circles  nv                        Rhythmic stab     3x15\"        Supine serratus press     2x10  10# KB        Quad push up plus   nv nv          Median nerve glide  HEP 10x 10x manual manual        Serratus flex w/ foam roll at wall   20x 20x 20x        Ther Ex             Wall slide Hep  Flex and abd 5\"x5 Flex and abd 5\"x10          Mary iso's abd, flex, IR/ER Hep  5\"x5 ea HEP          Dowel flex supine  5\"x10 5\"x10 nv         UBE  3'/3' nv 3'/3' 3'3'        Doorway pec stretch  15\"x3 20\"x3 20\"x3 20\" x3        CC single arm extension     2x10  40#        CC half kneel single arm row  66# 3x10 66# 3x10 66#  10x2 2x15  66#        Eccentric scaption   5# 2x10 5#  10x2 2x12  5#        Cross body stretch at wall   10\"x10 10\"x10 10x10\"        L Levator scap stretch   20\"x3 20\"x3         Cervical retraction   10x                                    Ther Activity                                       Gait Training                                       Modalities                                              "

## 2024-10-22 ENCOUNTER — OFFICE VISIT (OUTPATIENT)
Dept: PHYSICAL THERAPY | Facility: CLINIC | Age: 29
End: 2024-10-22
Payer: OTHER GOVERNMENT

## 2024-10-22 DIAGNOSIS — M25.512 CHRONIC LEFT SHOULDER PAIN: Primary | ICD-10-CM

## 2024-10-22 DIAGNOSIS — G89.29 CHRONIC LEFT SHOULDER PAIN: Primary | ICD-10-CM

## 2024-10-22 PROCEDURE — 97140 MANUAL THERAPY 1/> REGIONS: CPT

## 2024-10-22 PROCEDURE — 97110 THERAPEUTIC EXERCISES: CPT

## 2024-10-22 NOTE — PROGRESS NOTES
Daily Note     Today's date: 10/22/2024  Patient name: Walt Arshad  : 1995  MRN: [id number]  Referring provider: Tiffany Waterman PA-C  Dx:   Encounter Diagnosis     ICD-10-CM    1. Chronic left shoulder pain  M25.512     G89.29                      Subjective: Patient reports that his shoulder is getting a little better.  He states that he has more mobility in the shoulder, especially reaching behind his back.  No significant soreness after last session.      Objective: See treatment diary below      Assessment: Pt notes his shoulder is feeling like it is looser and moving better but still has that centralized shooting pain whicih was his main complaint when starting. MR PT performed  (+) cervical rotation lateral flexion test on L side. Will add PT 1st rib mobs for NV, but self 1st rib mobs were issued for home.  Future sessions should continue to progress ROM exercises as able. Tolerated treatment fair. Patient would benefit from continued PT.      Plan: Continue per plan of care.  Add 1st rib mobs     Precautions: none  Functional Limitations: weight lifting  Impairments: L shoulder ROM, (+) median nerve tension, L shoulder strength  [facility] Code: [email or web address]   POC expiration: 2024        POC Expires Reeval for Medicare to be completed Unit LImit Auth Expiration Date PT/OT/STVisit Limit   2024 By visit n/a N/a 2024 N/a    Completed on visit n/a                 Auth Status: n/a 10/4  10/9 10/11 10/15 10/17 10/22       Visit #   3 4 5 6       Visits remaining             Manuals             L shld PROM  12' 12' 13' 5' 10'       L UT STM   nv           Manual median nerve glide  2' 2' 2'         SOR   nv nv  5'       Cervical distraction   nv nv  5'       ULTT assess nv  (+) median nerve           Neuro Re-Ed             Tb ER  nv BTB 20x BTB  x20 BTB  x20 BTB  x20       TB row  nv           TB shld ext  nv           Body blade              Wall ball circles  nv                       "  Rhythmic stab     3x15\"        Supine serratus press     2x10  10# KB        Quad push up plus   nv nv          Median nerve glide  HEP 10x 10x manual manual        Serratus flex w/ foam roll at wall   20x 20x 20x        Self first rib mob with strap      x20                    Ther Ex             Wall slide Hep  Flex and abd 5\"x5 Flex and abd 5\"x10          Mary iso's abd, flex, IR/ER Hep  5\"x5 ea HEP          Dowel flex supine  5\"x10 5\"x10 nv         UBE  3'/3' nv 3'/3' 3'3' 3'/3'       Doorway pec stretch  15\"x3 20\"x3 20\"x3 20\" x3 20\"x3       CC single arm extension     2x10  40# 44#  10x2       CC half kneel single arm row  66# 3x10 66# 3x10 66#  10x2 2x15  66# 66#  10x2       Eccentric scaption   5# 2x10 5#  10x2 2x12  5# 10x2  5#       Cross body stretch at wall   10\"x10 10\"x10 10x10\" 10\"x10       L Levator scap stretch   20\"x3 20\"x3         Cervical retraction   10x                                    Ther Activity                                       Gait Training                                       Modalities                                              "

## 2024-10-24 ENCOUNTER — OFFICE VISIT (OUTPATIENT)
Dept: PHYSICAL THERAPY | Facility: CLINIC | Age: 29
End: 2024-10-24
Payer: OTHER GOVERNMENT

## 2024-10-24 DIAGNOSIS — M25.512 CHRONIC LEFT SHOULDER PAIN: Primary | ICD-10-CM

## 2024-10-24 DIAGNOSIS — G89.29 CHRONIC LEFT SHOULDER PAIN: Primary | ICD-10-CM

## 2024-10-24 PROCEDURE — 97110 THERAPEUTIC EXERCISES: CPT

## 2024-10-24 PROCEDURE — 97140 MANUAL THERAPY 1/> REGIONS: CPT

## 2024-10-24 PROCEDURE — 97112 NEUROMUSCULAR REEDUCATION: CPT

## 2024-10-24 NOTE — PROGRESS NOTES
"Daily Note     Today's date: 10/24/2024  Patient name: Walt Arshad  : 1995  MRN: [id number]  Referring provider: Tiffany Waterman PA-C  Dx:   Encounter Diagnosis     ICD-10-CM    1. Chronic left shoulder pain  M25.512     G89.29                      Subjective: Patient reports feeling like his mobility is a little better. Numbness is intermittent      Objective: See treatment diary below      Assessment: Patient tolerated treatment well overall. Added first rib mobilizations based on the (+) cervical rotation lateral flexion test last visit. Improved rib mobility with that test after seated first rib mobilizations. Continued with shoulder PROM as well. Instructed him to continue the self rib mobilization at home as part of HEP. He will benefit from skilled PT to address impairments and return to PLOF      Plan: continue first rib mobilizations     Precautions: none  Functional Limitations: weight lifting  Impairments: L shoulder ROM, (+) median nerve tension, L shoulder strength  [facility] [email or web address]   POC expiration: 2024        POC Expires Reeval for Medicare to be completed Unit LImit Auth Expiration Date PT/OT/STVisit Limit   2024 By visit n/a N/a 2024 N/a    Completed on visit n/a                 Auth Status: n/a 10/4  10/9 10/11 10/15 10/17 10/22 10/24      Visit #   3 4 5 6 7      Visits remaining             Manuals             L shld PROM  12' 12' 13' 5' 10' 10'      L UT STM   nv           Manual median nerve glide  2' 2' 2'   2'      SOR   nv nv  5'       Cervical distraction   nv nv  5'       Seated L 1st rib mob grade 3/4       5'      ULTT assess nv  (+) median nerve           Neuro Re-Ed             Tb ER  nv BTB 20x BTB  x20 BTB  x20 BTB  x20 BTB x20      TB row  nv           TB shld ext  nv           Body blade              Wall ball circles  nv                        Rhythmic stab     3x15\"        Supine serratus press     2x10  10# KB  2x10 10# KB      Quad push up " "plus   nv nv          Median nerve glide  HEP 10x 10x manual manual  manual      Serratus flex w/ foam roll at wall   20x 20x 20x  nv      Self first rib mob with strap      x20 HEP                   Ther Ex             Wall slide Hep  Flex and abd 5\"x5 Flex and abd 5\"x10          Martinler iso's abd, flex, IR/ER Hep  5\"x5 ea HEP          Dowel flex supine  5\"x10 5\"x10 nv         UBE  3'/3' nv 3'/3' 3'3' 3'/3' 3'/3'      Doorway pec stretch  15\"x3 20\"x3 20\"x3 20\" x3 20\"x3 20\"x3      CC single arm extension     2x10  40# 44#  10x2 44# 2x10      CC half kneel single arm row  66# 3x10 66# 3x10 66#  10x2 2x15  66# 66#  10x2 66# 2x10      Eccentric scaption   5# 2x10 5#  10x2 2x12  5# 10x2  5# 2x10 5#      Cross body stretch at wall   10\"x10 10\"x10 10x10\" 10\"x10       L Levator scap stretch   20\"x3 20\"x3         Cervical retraction   10x                                    Ther Activity                                       Gait Training                                       Modalities                                              "

## 2024-10-28 ENCOUNTER — APPOINTMENT (OUTPATIENT)
Dept: PHYSICAL THERAPY | Facility: CLINIC | Age: 29
End: 2024-10-28
Payer: OTHER GOVERNMENT

## 2024-10-31 ENCOUNTER — APPOINTMENT (OUTPATIENT)
Dept: PHYSICAL THERAPY | Facility: CLINIC | Age: 29
End: 2024-10-31
Payer: OTHER GOVERNMENT

## 2024-11-05 ENCOUNTER — OFFICE VISIT (OUTPATIENT)
Dept: OBGYN CLINIC | Facility: CLINIC | Age: 29
End: 2024-11-05
Payer: OTHER GOVERNMENT

## 2024-11-05 VITALS
HEIGHT: 73 IN | WEIGHT: 274 LBS | DIASTOLIC BLOOD PRESSURE: 82 MMHG | BODY MASS INDEX: 36.31 KG/M2 | SYSTOLIC BLOOD PRESSURE: 122 MMHG

## 2024-11-05 DIAGNOSIS — M25.512 CHRONIC LEFT SHOULDER PAIN: Primary | ICD-10-CM

## 2024-11-05 DIAGNOSIS — G89.29 CHRONIC LEFT SHOULDER PAIN: Primary | ICD-10-CM

## 2024-11-05 PROCEDURE — 99213 OFFICE O/P EST LOW 20 MIN: CPT | Performed by: ORTHOPAEDIC SURGERY

## 2024-11-05 NOTE — LETTER
November 5, 2024     Patient: Walt Arshad  YOB: 1995  Date of Visit: 11/5/20242      To Whom it May Concern:    Walt Arshad is under my professional care. Walt was seen in my office on 11/5/2024. Walt may return to work with limitations until further notice .  No lifting above shoulder level.  Lifting less than 20 pounds.  No repetitive use of left arm.  No running motion with left arm.  Patient will need to continue physical therapy to rehabilitate his left shoulder.  Need to obtain arthrogram MRI of his left shoulder.    If you have any questions or concerns, please don't hesitate to call.         Sincerely,          Vaughn Jang MD        CC: No Recipients

## 2024-11-05 NOTE — PROGRESS NOTES
Assessment:     1. Chronic left shoulder pain        Plan:     Problem List Items Addressed This Visit          Surgery/Wound/Pain    Chronic left shoulder pain - Primary     Findings consistent with chronic left shoulder pain, suspect labral injury or SLAP.  Discussed findings and treatment options with the patient.  Since patient has very little improvement with therapy, I recommended arthrogram MRI of his left shoulder to better assess the soft tissue in his left shoulder.  We will continue to limit patient's work activity using his left arm.  Continue physical therapy.  I will see his patient back after his arthrogram MRI.  All patient's questions were answered to his satisfaction.  This note is created using dictation transcription.  It may contain typographical errors, grammatical errors, improperly dictated words, background noise and other errors.         Relevant Orders    MRI arthrogram left shoulder      Subjective:     Patient ID: Walt Arshad is a 29 y.o. male.  Chief Complaint:  This is a right-hand-dominant 29-year-old white male here for evaluation of his left shoulder.  He had an initial injury in 2015 while in the .  He states that his arm got pulled behind with it extended overhead.  He felt a pop followed by immediate pain in the left shoulder.  He denies his shoulder dislocating at the time.  He states it took a few weeks for the pain to resolve.  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  The pain did somewhat improve with rest.  He states for the past 1.5 years the pain has been constant and progressively worsening.  He denies any new injury recently.  The pain is worse with any reaching overhead and behind him.  He has increased pain with incline bench press and  press.  He denies any feeling of instability in his shoulder.  He feels like he is losing range of motion.  He does have pain that extends up  into his neck at times.  No numbness or tingling down the left upper extremity.  He was seen by another orthopedic provider at [facility] who ordered an MRI.  he did not bring the MRI today but states he was told he had bursitis, tendinitis and osteoarthritis.  He was given a cortisone injection that gave a few days of improvement then the pain returned.  Patient has been attending physical therapy for 7 sessions and has been doing exercise at home.  He feels that the shoulder mobility has improved but continued experiencing pain with above his shoulder level activities.    Allergy:  No Known Allergies  Medications:  all current active meds have been reviewed  Past Medical History:  Past Medical History:   Diagnosis Date    Anxiety     Celiac disease 2023    GERD (gastroesophageal reflux disease)      Past Surgical History:  Past Surgical History:   Procedure Laterality Date    EGD      WISDOM TOOTH EXTRACTION       Family History:  Family History   Problem Relation Age of Onset    No Known Problems Mother     Cancer Father     No Known Problems Brother     Colon cancer Neg Hx     Colon polyps Neg Hx      Social History:  Social History     Substance and Sexual Activity   Alcohol Use Yes    Alcohol/week: 30.0 standard drinks of alcohol    Types: 30 Cans of beer per week    Comment: weekends     Social History     Substance and Sexual Activity   Drug Use Never     Social History     Tobacco Use   Smoking Status Former    Current packs/day: 0.00    Types: Cigarettes    Quit date:     Years since quittin.8    Passive exposure: Past   Smokeless Tobacco Former     Review of Systems   Constitutional: Negative.    HENT: Negative.     Eyes: Negative.    Respiratory: Negative.     Cardiovascular: Negative.    Gastrointestinal: Negative.    Endocrine: Negative.    Genitourinary: Negative.    Musculoskeletal:  Positive for arthralgias (left shoulder). Negative for joint swelling and neck pain.   Skin: Negative.   "  Allergic/Immunologic: Negative.    Neurological: Negative.  Negative for weakness and numbness.   Hematological: Negative.    Psychiatric/Behavioral: Negative.           Objective:  BP Readings from Last 1 Encounters:   11/05/24 122/82      Wt Readings from Last 1 Encounters:   11/05/24 124 kg (274 lb)      BMI:   Estimated body mass index is 36.15 kg/m² as calculated from the following:    Height as of this encounter: 6' 1\" (1.854 m).    Weight as of this encounter: 124 kg (274 lb).  BSA:   Estimated body surface area is 2.46 meters squared as calculated from the following:    Height as of this encounter: 6' 1\" (1.854 m).    Weight as of this encounter: 124 kg (274 lb).   Physical Exam  Vitals and nursing note reviewed.   Constitutional:       General: He is not in acute distress.     Appearance: Normal appearance. He is well-developed.   HENT:      Head: Normocephalic and atraumatic.      Right Ear: External ear normal.      Left Ear: External ear normal.   Eyes:      Extraocular Movements: Extraocular movements intact.      Conjunctiva/sclera: Conjunctivae normal.   Pulmonary:      Effort: Pulmonary effort is normal. No respiratory distress.   Musculoskeletal:      Cervical back: Neck supple.   Skin:     General: Skin is warm and dry.   Neurological:      Mental Status: He is alert and oriented to person, place, and time.   Psychiatric:         Mood and Affect: Mood normal.         Behavior: Behavior normal.       Left Shoulder Exam     Tenderness   Left shoulder tenderness location: parascapular.    Range of Motion   Active abduction:  normal Left shoulder active abduction: pain.  Internal rotation 0 degrees:  L5 (pain) abnormal   Internal rotation 90 degrees:  abnormal     Muscle Strength   Abduction: 5/5   Internal rotation: 5/5   External rotation: 5/5   Biceps: 5/5     Tests   Guerra test: positive  Cross arm: negative  Impingement: positive  Drop arm: negative  Sulcus: absent    Other   Erythema: " absent  Scars: absent  Sensation: normal  Pulse: present     Comments:  Positive Speeds  Negative empty can            No new images for review today

## 2024-11-05 NOTE — ASSESSMENT & PLAN NOTE
Findings consistent with chronic left shoulder pain, suspect labral injury or SLAP.  Discussed findings and treatment options with the patient.  Since patient has very little improvement with therapy, I recommended arthrogram MRI of his left shoulder to better assess the soft tissue in his left shoulder.  We will continue to limit patient's work activity using his left arm.  Continue physical therapy.  I will see his patient back after his arthrogram MRI.  All patient's questions were answered to his satisfaction.  This note is created using dictation transcription.  It may contain typographical errors, grammatical errors, improperly dictated words, background noise and other errors.

## 2024-11-07 ENCOUNTER — OFFICE VISIT (OUTPATIENT)
Dept: PHYSICAL THERAPY | Facility: CLINIC | Age: 29
End: 2024-11-07
Payer: OTHER GOVERNMENT

## 2024-11-07 DIAGNOSIS — G89.29 CHRONIC LEFT SHOULDER PAIN: Primary | ICD-10-CM

## 2024-11-07 DIAGNOSIS — M25.512 CHRONIC LEFT SHOULDER PAIN: Primary | ICD-10-CM

## 2024-11-07 PROCEDURE — 97140 MANUAL THERAPY 1/> REGIONS: CPT

## 2024-11-07 PROCEDURE — 97110 THERAPEUTIC EXERCISES: CPT

## 2024-11-07 NOTE — PROGRESS NOTES
"Daily Note     Today's date: 2024  Patient name: Walt Arshad  : 1995  MRN: [id number]  Referring provider: Tiffany Waterman PA-C  Dx:   Encounter Diagnosis     ICD-10-CM    1. Chronic left shoulder pain  M25.512     G89.29                      Subjective: Patient reports MRI is scheduled on 12/3/2024 for his L shoulder. Notes anytime he uses the L arm for a lot, he feels it tightens up quick      Objective: See treatment diary below      Assessment: Patient tolerated treatment well overall. He had L shoulder pain during flexion PROM and numbness/tingling during abduction PROM that was reduced after supine first rib mobilizations. He also had improved PROM post rib mobilizations. He will benefit from skilled PT to address impairments and return to PLOF.       Plan: continue first rib mobilizations     Precautions: none  Functional Limitations: weight lifting  Impairments: L shoulder ROM, (+) median nerve tension, L shoulder strength  [facility] [email or web address]   POC expiration: 2024        POC Expires Reeval for Medicare to be completed Unit LImit Auth Expiration Date PT/OT/STVisit Limit   2024 By visit n/a N/a 2024 N/a    Completed on visit n/a                 Auth Status: n/a 10/4  10/9 10/11 10/15 10/17 10/22 10/24 11/7     Visit #   3 4 5 6 7 8     Visits remaining             Manuals             L shld PROM  12' 12' 13' 5' 10' 10' 10'     L UT STM   nv           Manual median nerve glide  2' 2' 2'   2' 2'     SOR   nv nv  5'       Cervical distraction   nv nv  5'       Seated L 1st rib mob grade 3/4       5' Supine 5'     ULTT assess nv  (+) median nerve           Neuro Re-Ed             Tb ER  nv BTB 20x BTB  x20 BTB  x20 BTB  x20 BTB x20 BTB 20x     TB row  nv           TB shld ext  nv           Body blade              Wall ball circles  nv                        Rhythmic stab     3x15\"        Supine serratus press     2x10  10# KB  2x10 10# KB 2x10 10# KB     Quad push up plus   nv " "nv          Median nerve glide  HEP 10x 10x manual manual  manual      Serratus flex w/ foam roll at wall   20x 20x 20x  nv      Self first rib mob with strap      x20 HEP      Serratus flex w/ TB overhead        GTB 2x10     Pushup plus        nv                  Ther Ex             Wall slide Hep  Flex and abd 5\"x5 Flex and abd 5\"x10          Mary iso's abd, flex, IR/ER Hep  5\"x5 ea HEP          Dowel flex supine  5\"x10 5\"x10 nv         UBE  3'/3' nv 3'/3' 3'3' 3'/3' 3'/3' 3'/3'     Doorway pec stretch  15\"x3 20\"x3 20\"x3 20\" x3 20\"x3 20\"x3 20\"x3     CC single arm extension     2x10  40# 44#  10x2 44# 2x10 44# 2x10     CC half kneel single arm row  66# 3x10 66# 3x10 66#  10x2 2x15  66# 66#  10x2 66# 2x10 66# 3x10     Eccentric scaption   5# 2x10 5#  10x2 2x12  5# 10x2  5# 2x10 5# 2x10 5#     Cross body stretch at wall   10\"x10 10\"x10 10x10\" 10\"x10       L Levator scap stretch   20\"x3 20\"x3         Cervical retraction   10x                                    Ther Activity                                       Gait Training                                       Modalities                                              "

## 2024-11-11 ENCOUNTER — OFFICE VISIT (OUTPATIENT)
Dept: PHYSICAL THERAPY | Facility: CLINIC | Age: 29
End: 2024-11-11
Payer: OTHER GOVERNMENT

## 2024-11-11 DIAGNOSIS — G89.29 CHRONIC LEFT SHOULDER PAIN: Primary | ICD-10-CM

## 2024-11-11 DIAGNOSIS — M25.512 CHRONIC LEFT SHOULDER PAIN: Primary | ICD-10-CM

## 2024-11-11 PROCEDURE — 97140 MANUAL THERAPY 1/> REGIONS: CPT

## 2024-11-11 NOTE — PROGRESS NOTES
"Daily Note     Today's date: 2024  Patient name: Walt Arshad  : 1995  MRN: [id number]  Referring provider: Tiffany Waterman PA-C  Dx:   Encounter Diagnosis     ICD-10-CM    1. Chronic left shoulder pain  M25.512     G89.29                      Subjective: Patient reports a lot of pain and tightness today and he didn't even work.     Objective: See treatment diary below      Assessment: Focused session on manual therapy today. Pt largely benefits from manual stretching, TrP release, pec stretch and then first rib mob performed byGS PT. Showed improved L shoulder and cervical mobility.  He will benefit from skilled PT to address impairments and return to PLOF.       Plan: continue first rib mobilizations     Precautions: none  Functional Limitations: weight lifting  Impairments: L shoulder ROM, (+) median nerve tension, L shoulder strength  [facility] [email or web address]   POC expiration: 2024        POC Expires Reeval for Medicare to be completed Unit LImit Auth Expiration Date PT/OT/STVisit Limit   2024 By visit n/a N/a 2024 N/a    Completed on visit n/a                 Auth Status: n/a 10/4  10/9 10/11 10/15 10/17 10/22 10/24 11/7 11/11    Visit #   3 4 5 6 7 8 9    Visits remaining             Manuals             L shld PROM  12' 12' 13' 5' 10' 10' 10' 10'    L UT STM   nv           Manual median nerve glide  2' 2' 2'   2' 2' 2'    SOR   nv nv  5'       Cervical distraction   nv nv  5'       Seated L 1st rib mob grade 3/4       5' Supine 5' GS PT    ULTT assess nv  (+) median nerve           Neuro Re-Ed             Tb ER  nv BTB 20x BTB  x20 BTB  x20 BTB  x20 BTB x20 BTB 20x     TB row  nv           TB shld ext  nv           Body blade              Wall ball circles  nv                        Rhythmic stab     3x15\"        Supine serratus press     2x10  10# KB  2x10 10# KB 2x10 10# KB     Quad push up plus   nv nv          Median nerve glide  HEP 10x 10x manual manual  manual    " "  Serratus flex w/ foam roll at wall   20x 20x 20x  nv      Self first rib mob with strap      x20 HEP      Serratus flex w/ TB overhead        GTB 2x10     Pushup plus        nv                  Ther Ex             Wall slide Hep  Flex and abd 5\"x5 Flex and abd 5\"x10          [place] iso's abd, flex, IR/ER Hep  5\"x5 ea HEP          Dowel flex supine  5\"x10 5\"x10 nv         UBE  3'/3' nv 3'/3' 3'3' 3'/3' 3'/3' 3'/3' 3'/3'    Doorway pec stretch  15\"x3 20\"x3 20\"x3 20\" x3 20\"x3 20\"x3 20\"x3     CC single arm extension     2x10  40# 44#  10x2 44# 2x10 44# 2x10     CC half kneel single arm row  66# 3x10 66# 3x10 66#  10x2 2x15  66# 66#  10x2 66# 2x10 66# 3x10     Eccentric scaption   5# 2x10 5#  10x2 2x12  5# 10x2  5# 2x10 5# 2x10 5#     Cross body stretch at wall   10\"x10 10\"x10 10x10\" 10\"x10       L Levator scap stretch   20\"x3 20\"x3         Cervical retraction   10x                                    Ther Activity                                       Gait Training                                       Modalities                                              "

## 2024-11-14 ENCOUNTER — OFFICE VISIT (OUTPATIENT)
Dept: PHYSICAL THERAPY | Facility: CLINIC | Age: 29
End: 2024-11-14
Payer: OTHER GOVERNMENT

## 2024-11-14 DIAGNOSIS — M25.512 CHRONIC LEFT SHOULDER PAIN: Primary | ICD-10-CM

## 2024-11-14 DIAGNOSIS — G89.29 CHRONIC LEFT SHOULDER PAIN: Primary | ICD-10-CM

## 2024-11-14 PROCEDURE — 97110 THERAPEUTIC EXERCISES: CPT

## 2024-11-14 PROCEDURE — 97140 MANUAL THERAPY 1/> REGIONS: CPT

## 2024-11-14 NOTE — PROGRESS NOTES
"Daily Note     Today's date: 2024  Patient name: Walt Arshad  : 1995  MRN: [id number]  Referring provider: Tiffany Waterman PA-C  Dx:   Encounter Diagnosis     ICD-10-CM    1. Chronic left shoulder pain  M25.512     G89.29                      Subjective: Patient reports shoulder is feeling a little better compared to Monday. He still has a lot of tightness in his shoulder    Objective: See treatment diary below      Assessment: Patient tolerated treatment fair. Limited PROM pre treatment that improved after first rib mobilizations and shoulder distraction. Added theraband T and Y for low and middle trapezius strengthening for shoulder stabilization. He will benefit from skilled PT to address impairments and return to PLOF      Plan: continue first rib mobilizations     Precautions: none  Functional Limitations: weight lifting  Impairments: L shoulder ROM, (+) median nerve tension, L shoulder strength  [facility] Code: [email or web address]   POC expiration: 2024        POC Expires Reeval for Medicare to be completed Unit LImit Auth Expiration Date PT/OT/STVisit Limit   2024 By visit n/a N/a 2024 N/a    Completed on visit n/a                 Auth Status: n/a 10/15 10/17 10/22 10/24 11/7 11/11 11/14      Visit # 4 5 6 7 8 9 10      Visits remaining             Manuals             L shld PROM 13' 5' 10' 10' 10' 10' 10'      L UT STM              Manual median nerve glide 2'   2' 2' 2' 2'      SOR nv  5'          Cervical distraction nv  5'          Seated L 1st rib mob grade 3/4    5' Supine 5' GS PT Supine 5'      Neuro Re-Ed             Tb ER BTB  x20 BTB  x20 BTB  x20 BTB x20 BTB 20x  BTB 2x10      TB row             TB shld ext             Body blade              Rhythmic stab  3x15\"           Supine serratus press  2x10  10# KB  2x10 10# KB 2x10 10# KB  2x10 10# KB      Quad push up plus              Median nerve glide manual manual  manual   manual      Serratus flex w/ foam roll at wall 20x 20x " " nv         Self first rib mob with strap   x20 HEP         Serratus flex w/ TB overhead     GTB 2x10  GTB 2x10      Pushup plus     nv  2x10      TB Y       GTB 20x      TB T       GTB 20x                   Ther Ex             Wall slide             Dowel flex supine nv            UBE 3'/3' 3'3' 3'/3' 3'/3' 3'/3' 3'/3' 3'/3'      Doorway pec stretch 20\"x3 20\" x3 20\"x3 20\"x3 20\"x3  20\"x3      CC single arm extension  2x10  40# 44#  10x2 44# 2x10 44# 2x10  55# 2x10      CC half kneel single arm row 66#  10x2 2x15  66# 66#  10x2 66# 2x10 66# 3x10        Eccentric scaption 5#  10x2 2x12  5# 10x2  5# 2x10 5# 2x10 5#  2x10 5#      Cross body stretch at wall 10\"x10 10x10\" 10\"x10          L Levator scap stretch 20\"x3            Cervical retraction                                       Ther Activity                                       Gait Training                                       Modalities                                              "

## 2024-11-18 ENCOUNTER — APPOINTMENT (OUTPATIENT)
Dept: PHYSICAL THERAPY | Facility: CLINIC | Age: 29
End: 2024-11-18
Payer: OTHER GOVERNMENT

## 2024-11-25 ENCOUNTER — OFFICE VISIT (OUTPATIENT)
Dept: PHYSICAL THERAPY | Facility: CLINIC | Age: 29
End: 2024-11-25
Payer: OTHER GOVERNMENT

## 2024-11-25 DIAGNOSIS — M25.512 CHRONIC LEFT SHOULDER PAIN: Primary | ICD-10-CM

## 2024-11-25 DIAGNOSIS — G89.29 CHRONIC LEFT SHOULDER PAIN: Primary | ICD-10-CM

## 2024-11-25 PROCEDURE — 97140 MANUAL THERAPY 1/> REGIONS: CPT

## 2024-11-25 NOTE — PROGRESS NOTES
"Daily Note     Today's date: 2024  Patient name: Walt Arshad  : 1995  MRN: [id number]  Referring provider: Tiffany Waterman PA-C  Dx:   Encounter Diagnosis     ICD-10-CM    1. Chronic left shoulder pain  M25.512     G89.29                      Subjective: Patient reports his shoulder continues to be really tight if he isnt very consistent with his stretches.     Objective: See treatment diary below      Assessment: Focused on manual therapy today and pt doing TE's at the gym after therapy. He felt much better post manual therapy with improved motion and much less crepitus. He will benefit from skilled PT to address impairments and return to PLOF      Plan: Awaiting MRI results of follow up with ortho     Precautions: none  Functional Limitations: weight lifting  Impairments: L shoulder ROM, (+) median nerve tension, L shoulder strength  [facility] Code: [email or web address]   POC expiration: 2024        POC Expires Reeval for Medicare to be completed Unit LImit Auth Expiration Date PT/OT/STVisit Limit   2024 By visit n/a N/a 2024 N/a    Completed on visit n/a                 Auth Status: n/a 10/15 10/17 10/22 10/24 11/7 11/11 11/14 11/25     Visit # 4 5 6 7 8 9 10 11     Visits remaining             Manuals             L shld PROM 13' 5' 10' 10' 10' 10' 10' 20'     L UT STM         4'     Manual median nerve glide 2'   2' 2' 2' 2'      SOR nv  5'          Cervical distraction nv  5'          Seated L 1st rib mob grade 3/4    5' Supine 5' GS PT Supine 5'      Neuro Re-Ed             Tb ER BTB  x20 BTB  x20 BTB  x20 BTB x20 BTB 20x  BTB 2x10      TB row             TB shld ext             Body blade              Rhythmic stab  3x15\"           Supine serratus press  2x10  10# KB  2x10 10# KB 2x10 10# KB  2x10 10# KB      Quad push up plus              Median nerve glide manual manual  manual   manual      Serratus flex w/ foam roll at wall 20x 20x  nv         Self first rib mob with strap   x20 HEP    " "     Serratus flex w/ TB overhead     GTB 2x10  GTB 2x10      Pushup plus     nv  2x10      TB Y       GTB 20x      TB T       GTB 20x                   Ther Ex             Wall slide             Dowel flex supine nv            UBE 3'/3' 3'3' 3'/3' 3'/3' 3'/3' 3'/3' 3'/3' 3'/3'     Doorway pec stretch 20\"x3 20\" x3 20\"x3 20\"x3 20\"x3  20\"x3      CC single arm extension  2x10  40# 44#  10x2 44# 2x10 44# 2x10  55# 2x10      CC half kneel single arm row 66#  10x2 2x15  66# 66#  10x2 66# 2x10 66# 3x10        Eccentric scaption 5#  10x2 2x12  5# 10x2  5# 2x10 5# 2x10 5#  2x10 5#      Cross body stretch at wall 10\"x10 10x10\" 10\"x10          L Levator scap stretch 20\"x3            Cervical retraction                                       Ther Activity                                       Gait Training                                       Modalities                                              "

## 2024-12-03 ENCOUNTER — HOSPITAL ENCOUNTER (OUTPATIENT)
Dept: RADIOLOGY | Facility: HOSPITAL | Age: 29
Discharge: HOME | End: 2024-12-03
Attending: ORTHOPAEDIC SURGERY
Payer: OTHER GOVERNMENT

## 2024-12-03 ENCOUNTER — HOSPITAL ENCOUNTER (OUTPATIENT)
Dept: MRI IMAGING | Facility: HOSPITAL | Age: 29
Discharge: HOME | End: 2024-12-03
Attending: ORTHOPAEDIC SURGERY
Payer: OTHER GOVERNMENT

## 2024-12-03 DIAGNOSIS — G89.29 CHRONIC LEFT SHOULDER PAIN: ICD-10-CM

## 2024-12-03 DIAGNOSIS — M25.512 CHRONIC LEFT SHOULDER PAIN: ICD-10-CM

## 2024-12-03 PROCEDURE — A9585 GADOBUTROL INJECTION: HCPCS | Performed by: ORTHOPAEDIC SURGERY

## 2024-12-03 PROCEDURE — 73222 MRI JOINT UPR EXTREM W/DYE: CPT

## 2024-12-03 PROCEDURE — 77002 NEEDLE LOCALIZATION BY XRAY: CPT

## 2024-12-03 PROCEDURE — 23350 INJECTION FOR SHOULDER X-RAY: CPT

## 2024-12-03 RX ORDER — ROPIVACAINE HYDROCHLORIDE 2 MG/ML
10 INJECTION, SOLUTION EPIDURAL; INFILTRATION; PERINEURAL ONCE
Status: COMPLETED | OUTPATIENT
Start: 2024-12-03 | End: 2024-12-03

## 2024-12-03 RX ORDER — LIDOCAINE HYDROCHLORIDE 10 MG/ML
5 INJECTION, SOLUTION EPIDURAL; INFILTRATION; INTRACAUDAL; PERINEURAL
Status: COMPLETED | OUTPATIENT
Start: 2024-12-03 | End: 2024-12-03

## 2024-12-03 RX ORDER — GADOBUTROL 604.72 MG/ML
2 INJECTION INTRAVENOUS
Status: COMPLETED | OUTPATIENT
Start: 2024-12-03 | End: 2024-12-03

## 2024-12-03 RX ADMIN — ROPIVACAINE HYDROCHLORIDE 2 ML: 2 INJECTION, SOLUTION EPIDURAL; INFILTRATION at 10:36

## 2024-12-03 RX ADMIN — GADOBUTROL 0.2 ML: 604.72 INJECTION INTRAVENOUS at 10:36

## 2024-12-03 RX ADMIN — IOHEXOL 1 ML: 300 INJECTION, SOLUTION INTRAVENOUS at 10:35

## 2024-12-03 RX ADMIN — LIDOCAINE HYDROCHLORIDE 4 ML: 10 INJECTION, SOLUTION EPIDURAL; INFILTRATION; INTRACAUDAL; PERINEURAL at 10:36

## 2024-12-05 ENCOUNTER — OFFICE VISIT (OUTPATIENT)
Dept: PHYSICAL THERAPY | Facility: CLINIC | Age: 29
End: 2024-12-05
Payer: OTHER GOVERNMENT

## 2024-12-05 DIAGNOSIS — M25.512 CHRONIC LEFT SHOULDER PAIN: Primary | ICD-10-CM

## 2024-12-05 DIAGNOSIS — G89.29 CHRONIC LEFT SHOULDER PAIN: Primary | ICD-10-CM

## 2024-12-05 PROCEDURE — 97110 THERAPEUTIC EXERCISES: CPT

## 2024-12-05 PROCEDURE — 97140 MANUAL THERAPY 1/> REGIONS: CPT

## 2024-12-05 NOTE — PROGRESS NOTES
PT Re-Evaluation       Today's date: 2024  Patient name: Walt Arshad  : 1995  MRN: [id number]  Referring provider: Tiffany Waterman PA-C  Dx:   Encounter Diagnosis     ICD-10-CM    1. Chronic left shoulder pain  M25.512     G89.29                      Assessment  Impairments: abnormal or restricted ROM, activity intolerance, impaired physical strength, lacks appropriate home exercise program, pain with function, poor body mechanics, unable to perform ADL, participation limitations, activity limitations and endurance  Symptom irritability: moderate    Assessment details: Patient is a 29  year old  reporting to therapy for initial evaluation with the referring diagnosis of Chronic left shoulder pain. Pain displays deficits including decreased shoulder mobility and rom, decreased shoulder strength on mmt, ttp along medial border of scapula and upper trap, and pain with shoulder movement. Patient with noted limitations with adls such as reaching and lifting due to stated deficits. He will benefit from skilled physical therapy in order to improve his symptoms and maximize his function. Patient was educated on examination findings today and goals of therapy. Established hep with him today which he tolerated well. Negative prognostic factors include chronicity of reported symptoms.     2024: Patient has made good improvements in L shoulder ROM since beginning skilled PT. He still has pain especially with L shoulder ER and abduction with mild weakness. These impairments still limit him with weight lifting and day to day overhead movements. He did have an MRI 2 days ago and will follow up with the Orthopedic Surgeon next week to determine next steps. Will determine plan for PT at that time but he would benefit from skilled PT to continue addressing ROM and stability.          Understanding of Dx/Px/POC: good     Prognosis: good    Goals  STG  -Patient will be IND with basic level HEP within 4 weeks in order  "to make improvements at home (met)  - Patient will have a decrease in pain levels of 50%  within 4 week in order to improve quality of life(met)  - patient will have 10 degree improvement in shoulder elevation within 4 weeks in order to improve function (met)      LTG  -Patient will be IND with an advanced level HEP within 8 weeks in order to make improvements at home   - Patient will reach his FOTO score goal within 8 weeks(not assessed today)  - patient will improve strength to wfl within 8 weeks in order to complete adls(good progress)  - patient will improve shoulder rom to wfl within 8 weeks in order to complete adls(good progress)  - patient will perform with adls with pain </2/10 within 8 weeks(good progress)    Plan  Patient would benefit from: PT eval and skilled physical therapy  Referral necessary: No  Planned modality interventions: cryotherapy and thermotherapy: hydrocollator packs    Planned therapy interventions: IASTM, joint mobilization, manual therapy, massage, muscle pump exercises, nerve gliding, neuromuscular re-education, patient/caregiver education, postural training, strengthening, stretching, therapeutic activities, therapeutic exercise, flexibility, functional ROM exercises, ADL retraining, body mechanics training, graded activity, graded exercise, home exercise program and therapeutic training    Frequency: 2x week  Duration in weeks: 8  Plan of Care beginning date: 12/5/2024  Plan of Care expiration date: 1/30/2025  Treatment plan discussed with: patient        Subjective Evaluation    History of Present Illness  Mechanism of injury: Patient reports to therapy with Left sided shoulder pain that he locates from the back of his shoulder blade to his lateral shoulder and bicep. He reports his pain is \"like a dull ache\" and is very uncomfortable. He reports he gets his pain intermittently. He reports when he wake sup in the morning his shoulder is pretty good. He reports when he starts doing " anything activity wise he gets pain.  He does note popping in his shoulder with motion. Patient states he injured his shoulder a few times in the past with the first of which being in . However he reports his pain got better until about a year and half ago in which it has been pretty consistent since then. He had a mri with [facility] in March of this past year however he reports it was inconclusive. He also repoprts he has a cortisone injection into his shoulder in  without any relief.Pain is improved with massage or rubbing the back of his shoulder blade and warm showers. He denies any past surgeries on his shoulder or arm. He does get occasional tingling in his hand which he reports is every now and then however it generally subsides in less than a minute. Patient's goal for therapy are to are be comfortable and be able to lift weight without pain.     2024: Patient notes still having shoulder pain and feeling tightness in his shoulder. He had MRI two days ago and follows up with Dr. Jang next week about MRI results. Has tried more in the gym like [employer] and has been okay with that            Recurrent probem    Quality of life: good    Patient Goals  Patient goals for therapy: increased strength, independence with ADLs/IADLs, return to sport/leisure activities, increased motion and decreased pain    Pain  Current pain ratin  At best pain ratin  At worst pain ratin  Quality: dull ache, tight and throbbing  Relieving factors: heat and rest  Aggravating factors: overhead activity and lifting  Progression: no change      Diagnostic Tests  X-ray: normal  Treatments  Previous treatment: injection treatment        Objective     Palpation   Left   Tenderness of the middle trapezius and upper trapezius.     Neurological Testing     Sensation     Shoulder   Left Shoulder   Intact: light touch    Right Shoulder   Intact: Light touch    Reflexes   Left   Biceps (C5/C6): normal  (2+)  Brachioradialis (C6): normal (2+)    Right   Biceps (C5/C6): normal (2+)  Brachioradialis (C6): normal (2+)    Additional Neurological Details  Ue dermatomes/myotomes wnl     Active Range of Motion   Cervical/Thoracic Spine       Cervical    Left lateral flexion:  WFL  Right lateral flexion:  WFL  Left rotation:  WFL  Right rotation:  WFL  Left Shoulder   Flexion: 160 degrees with pain  Abduction: 155 degrees with pain  External rotation 90°: 75 degrees with pain    Right Shoulder   Flexion: WFL  Abduction: WFL  External rotation BTH: T1   Internal rotation BTB: T6     Strength/Myotome Testing     Left Shoulder     Planes of Motion   Flexion: 4+   Abduction: 4+   External rotation at 0°: 5   External rotation at 90°: 4+ (pain)   Internal rotation at 0°: 5     Isolated Muscles   Biceps: 5   Triceps: 5     Right Shoulder     Planes of Motion   Flexion: 5   Abduction: 5   External rotation at 0°: 5   Internal rotation at 0°: 5     Isolated Muscles   Biceps: 5   Triceps: 5     Tests   Cervical     Left   Negative Spurling's Test A.     Right   Negative Spurling's Test A.     Left Shoulder   Positive empty can and painful arc.   Negative Hawkin's and Speed's.     Additional Tests Details  (+) cervical R rotation L lateral flexion test           Precautions: none  Functional Limitations: weight lifting  Impairments: L shoulder ROM, (+) median nerve tension, L shoulder strength  MedBridge Code: [email or web address]   POC expiration: 1/30/2025        POC Expires Reeval for Medicare to be completed Unit LImit Auth Expiration Date PT/OT/STVisit Limit   1/30/2025 By visit n/a N/a 12/31/2024 N/a    Completed on visit n/a                 Auth Status: n/a 10/15 10/17 10/22 10/24 11/7 11/11 11/14 11/25 12/5    Visit # 4 5 6 7 8 9 10 11 12    Visits remaining             Manuals             L shld PROM 13' 5' 10' 10' 10' 10' 10' 20' 15'    L UT STM         4'     Manual median nerve glide 2'   2' 2' 2' 2'      SOR nv  5'         "  Cervical distraction nv  5'          Seated L 1st rib mob grade 3/4    5' Supine 5' GS PT Supine 5'  Supine 5'    Neuro Re-Ed             Tb ER BTB  x20 BTB  x20 BTB  x20 BTB x20 BTB 20x  BTB 2x10      TB row             TB shld ext             Body blade              Rhythmic stab  3x15\"           Supine serratus press  2x10  10# KB  2x10 10# KB 2x10 10# KB  2x10 10# KB  2x10  10# KB    Quad push up plus              Median nerve glide manual manual  manual   manual      Serratus flex w/ foam roll at wall 20x 20x  nv         Self first rib mob with strap   x20 HEP         Serratus flex w/ TB overhead     GTB 2x10  GTB 2x10  GTB 2x10    Pushup plus     nv  2x10  2x10    TB Y       GTB 20x  GTB 20x    TB T       GTB 20x  GTB 20x    KB overhead press         10# 2x10                              Ther Ex             Wall slide             Dowel flex supine nv            UBE 3'/3' 3'3' 3'/3' 3'/3' 3'/3' 3'/3' 3'/3' 3'/3' 3'/3'    Doorway pec stretch 20\"x3 20\" x3 20\"x3 20\"x3 20\"x3  20\"x3      CC single arm extension  2x10  40# 44#  10x2 44# 2x10 44# 2x10  55# 2x10  55# 2x10    CC half kneel single arm row 66#  10x2 2x15  66# 66#  10x2 66# 2x10 66# 3x10        Eccentric scaption 5#  10x2 2x12  5# 10x2  5# 2x10 5# 2x10 5#  2x10 5#  2x10 5#    Cross body stretch at wall 10\"x10 10x10\" 10\"x10          L Levator scap stretch 20\"x3            Cervical retraction                                       Ther Activity                                       Gait Training                                       Modalities                                              "

## 2024-12-12 ENCOUNTER — OFFICE VISIT (OUTPATIENT)
Dept: OBGYN CLINIC | Facility: CLINIC | Age: 29
End: 2024-12-12
Payer: OTHER GOVERNMENT

## 2024-12-12 ENCOUNTER — OFFICE VISIT (OUTPATIENT)
Dept: PHYSICAL THERAPY | Facility: CLINIC | Age: 29
End: 2024-12-12
Payer: OTHER GOVERNMENT

## 2024-12-12 VITALS
HEIGHT: 73 IN | WEIGHT: 274 LBS | DIASTOLIC BLOOD PRESSURE: 80 MMHG | SYSTOLIC BLOOD PRESSURE: 120 MMHG | BODY MASS INDEX: 36.31 KG/M2

## 2024-12-12 DIAGNOSIS — G89.29 CHRONIC LEFT SHOULDER PAIN: Primary | ICD-10-CM

## 2024-12-12 DIAGNOSIS — M25.512 CHRONIC LEFT SHOULDER PAIN: Primary | ICD-10-CM

## 2024-12-12 DIAGNOSIS — M77.8 TENDINITIS OF LEFT SHOULDER: Primary | ICD-10-CM

## 2024-12-12 PROCEDURE — 97140 MANUAL THERAPY 1/> REGIONS: CPT

## 2024-12-12 PROCEDURE — 97110 THERAPEUTIC EXERCISES: CPT

## 2024-12-12 PROCEDURE — 99213 OFFICE O/P EST LOW 20 MIN: CPT | Performed by: ORTHOPAEDIC SURGERY

## 2024-12-12 NOTE — PROGRESS NOTES
Assessment:     1. Tendinitis of left shoulder        Plan:     Problem List Items Addressed This Visit          Musculoskeletal and Integument    Tendinitis of left shoulder - Primary    Findings consistent with left shoulder tendinitis.  Discussed findings and treatment options with the patient.  I reviewed patient's left shoulder arthrogram MRI and radiology report with him.  I discussed prognosis of his shoulder condition.  I recommended shoulder to continue physical therapy to improve his motion, strength, and endurance.  I will see patient back in 2 to 3 months time for reevaluation.  All patient's questions were answered to his satisfaction.  This note is created using dictation transcription.  It may contain typographical errors, grammatical errors, improperly dictated words, background noise and other errors.           Subjective:     Patient ID: Walt Arshad is a 29 y.o. male.  Chief Complaint:  This is a right-hand-dominant 29-year-old white male here for evaluation of his left shoulder.  He had an initial injury in 2015 while in the .  He states that his arm got pulled behind with it extended overhead.  He felt a pop followed by immediate pain in the left shoulder.  He denies his shoulder dislocating at the time.  He states it took a few weeks for the pain to resolve.  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  The pain did somewhat improve with rest.  He states for the past 1.5 years the pain has been constant and progressively worsening.  He denies any new injury recently.  The pain is worse with any reaching overhead and behind him.  He has increased pain with incline bench press and  press.  He denies any feeling of instability in his shoulder.  He feels like he is losing range of motion.  He does have pain that extends up into his neck at times.  No numbness or tingling down the left upper extremity.  He was  seen by another orthopedic provider at [place] who ordered an MRI.  he did not bring the MRI today but states he was told he had bursitis, tendinitis and osteoarthritis.  He was given a cortisone injection that gave a few days of improvement then the pain returned.  Patient has been attending physical therapy and the shoulder motion has improved.  He still has some limitation at endrange and also some pain terminal motion especially reaching to the side and overhead.  Patient is here to review his left shoulder arthrogram MRI.    Allergy:  No Known Allergies  Medications:  all current active meds have been reviewed  Past Medical History:  Past Medical History:   Diagnosis Date    Anxiety     Celiac disease 2023    GERD (gastroesophageal reflux disease)      Past Surgical History:  Past Surgical History:   Procedure Laterality Date    EGD      FL INJECTION LEFT SHOULDER (ARTHROGRAM)  12/3/2024    WISDOM TOOTH EXTRACTION       Family History:  Family History   Problem Relation Age of Onset    No Known Problems Mother     Cancer Father     No Known Problems Brother     Colon cancer Neg Hx     Colon polyps Neg Hx      Social History:  Social History     Substance and Sexual Activity   Alcohol Use Yes    Alcohol/week: 30.0 standard drinks of alcohol    Types: 30 Cans of beer per week    Comment: weekends     Social History     Substance and Sexual Activity   Drug Use Never     Social History     Tobacco Use   Smoking Status Former    Current packs/day: 0.00    Types: Cigarettes    Quit date:     Years since quittin.9    Passive exposure: Past   Smokeless Tobacco Former     Review of Systems   Constitutional: Negative.    HENT: Negative.     Eyes: Negative.    Respiratory: Negative.     Cardiovascular: Negative.    Gastrointestinal: Negative.    Endocrine: Negative.    Genitourinary: Negative.    Musculoskeletal:  Positive for arthralgias (left shoulder). Negative for joint swelling and neck pain.   Skin:  "Negative.    Allergic/Immunologic: Negative.    Neurological: Negative.  Negative for weakness and numbness.   Hematological: Negative.    Psychiatric/Behavioral: Negative.           Objective:  BP Readings from Last 1 Encounters:   12/12/24 120/80      Wt Readings from Last 1 Encounters:   12/12/24 124 kg (274 lb)      BMI:   Estimated body mass index is 36.15 kg/m² as calculated from the following:    Height as of this encounter: 6' 1\" (1.854 m).    Weight as of this encounter: 124 kg (274 lb).  BSA:   Estimated body surface area is 2.46 meters squared as calculated from the following:    Height as of this encounter: 6' 1\" (1.854 m).    Weight as of this encounter: 124 kg (274 lb).   Physical Exam  Vitals and nursing note reviewed.   Constitutional:       General: He is not in acute distress.     Appearance: Normal appearance. He is well-developed.   HENT:      Head: Normocephalic and atraumatic.      Right Ear: External ear normal.      Left Ear: External ear normal.   Eyes:      Extraocular Movements: Extraocular movements intact.      Conjunctiva/sclera: Conjunctivae normal.   Pulmonary:      Effort: Pulmonary effort is normal. No respiratory distress.   Musculoskeletal:      Cervical back: Neck supple.   Skin:     General: Skin is warm and dry.   Neurological:      Mental Status: He is alert and oriented to person, place, and time.   Psychiatric:         Mood and Affect: Mood normal.         Behavior: Behavior normal.       Left Shoulder Exam     Tenderness   Left shoulder tenderness location: parascapular.    Range of Motion   Active abduction:  normal Left shoulder active abduction: pain.  Internal rotation 0 degrees:  L5 (pain) abnormal   Internal rotation 90 degrees:  abnormal     Muscle Strength   Abduction: 5/5   Internal rotation: 5/5   External rotation: 5/5   Biceps: 5/5     Tests   Guerra test: positive  Cross arm: negative  Impingement: positive  Drop arm: negative  Sulcus: absent    Other "   Erythema: absent  Scars: absent  Sensation: normal  Pulse: present     Comments:  Positive Speeds  Negative empty can            I have personally reviewed pertinent films in PACS and my interpretation is left shoulder MRI showed intact rotator cuffs, no SLAP lesion.  Bicep intact.  Insertional tendinosis of the supraspinatus and infraspinatus tendon.

## 2024-12-12 NOTE — PROGRESS NOTES
Daily Note     Today's date: 2024  Patient name: Walt Arshad  : 1995  MRN: [id number]  Referring provider: Tiffany Waterman PA-C  Dx:   Encounter Diagnosis     ICD-10-CM    1. Chronic left shoulder pain  M25.512     G89.29                      Subjective: Pt reports overall he is continuing to see positive results. He is no longer having any of the sharp shooting pains, the numbness is much less frequent but he continues to feel very tight.       Objective: See treatment diary below      Assessment: Tolerated treatment well. 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 He benefits greatly from the manual stretching of the L pec muscles and ER. Also incorporated a lot of prone exercises today to work on posterior muscle groups because his shoulder sits very anteriorly due to the anterior muscle tightness.  His PROM is much improved post sessions. Patient demonstrated fatigue post treatment, exhibited good technique with therapeutic exercises, and would benefit from continued PT      Plan: Continue per plan of care.  Progress treatment as tolerated.   Transition to 1x per work up until follow up appointment with ortho MD in Feb.      Precautions: none  Functional Limitations: weight lifting  Impairments: L shoulder ROM, (+) median nerve tension, L shoulder strength  MedBridge Code: [email or web address]   POC expiration: 2025        POC Expires Reeval for Medicare to be completed Unit LImit Auth Expiration Date PT/OT/STVisit Limit   2025 By visit n/a N/a 2024 N/a    Completed on visit n/a                 Auth Status: n/a 10/15 10/17 10/22 10/24 11/7 11/11 11/14 11/25 12/5 12/12   Visit # 4 5 6 7 8 9 10 11 12 13   Visits remaining             Manuals             L shld PROM 13' 5' 10' 10' 10' 10' 10' 20' 15' 25'   L UT STM         4'     Manual median nerve glide 2'   2' 2' 2' 2'      SOR nv  5'          Cervical distraction nv  5'          Seated L 1st rib mob grade 3/4     "5' Supine 5' GS PT Supine 5'  Supine 5'    Neuro Re-Ed             Tb ER BTB  x20 BTB  x20 BTB  x20 BTB x20 BTB 20x  BTB 2x10      TB row             TB shld ext             Body blade              Rhythmic stab  3x15\"           Supine serratus press  2x10  10# KB  2x10 10# KB 2x10 10# KB  2x10 10# KB  2x10  10# KB    Quad push up plus              Median nerve glide manual manual  manual   manual      Serratus flex w/ foam roll at wall 20x 20x  nv         Self first rib mob with strap   x20 HEP         Serratus flex w/ TB overhead     GTB 2x10  GTB 2x10  GTB 2x10    Pushup plus     nv  2x10  2x10    TB Y       GTB 20x  GTB 20x    TB T       GTB 20x  GTB 20x    KB overhead press         10# 2x10                              Ther Ex             Wall slide             Dowel flex supine nv            UBE 3'/3' 3'3' 3'/3' 3'/3' 3'/3' 3'/3' 3'/3' 3'/3' 3'/3' 3'/3'   Doorway pec stretch 20\"x3 20\" x3 20\"x3 20\"x3 20\"x3  20\"x3   30\"x3 high and low   CC single arm extension  2x10  40# 44#  10x2 44# 2x10 44# 2x10  55# 2x10  55# 2x10    CC half kneel single arm row 66#  10x2 2x15  66# 66#  10x2 66# 2x10 66# 3x10        Eccentric scaption 5#  10x2 2x12  5# 10x2  5# 2x10 5# 2x10 5#  2x10 5#  2x10 5#    Cross body stretch at wall 10\"x10 10x10\" 10\"x10          L Levator scap stretch 20\"x3            Cervical retraction             Prone Rows          10x2   Prone Scap retractions          10x2   Prone b/l shoulder Ext            10x2   Ther Activity                                       Gait Training                                       Modalities                                              " show

## 2024-12-12 NOTE — ASSESSMENT & PLAN NOTE
Findings consistent with left shoulder tendinitis.  Discussed findings and treatment options with the patient.  I reviewed patient's left shoulder arthrogram MRI and radiology report with him.  I discussed prognosis of his shoulder condition.  I recommended shoulder to continue physical therapy to improve his motion, strength, and endurance.  I will see patient back in 2 to 3 months time for reevaluation.  All patient's questions were answered to his satisfaction.  This note is created using dictation transcription.  It may contain typographical errors, grammatical errors, improperly dictated words, background noise and other errors.

## 2024-12-19 ENCOUNTER — OFFICE VISIT (OUTPATIENT)
Dept: PHYSICAL THERAPY | Facility: CLINIC | Age: 29
End: 2024-12-19
Payer: OTHER GOVERNMENT

## 2024-12-19 DIAGNOSIS — G89.29 CHRONIC LEFT SHOULDER PAIN: Primary | ICD-10-CM

## 2024-12-19 DIAGNOSIS — M25.512 CHRONIC LEFT SHOULDER PAIN: Primary | ICD-10-CM

## 2024-12-19 PROCEDURE — 97110 THERAPEUTIC EXERCISES: CPT

## 2024-12-19 NOTE — PROGRESS NOTES
Daily Note     Today's date: 2024  Patient name: Walt Arshad  : 1995  MRN: [id number]  Referring provider: Tiffany Waterman PA-C  Dx:   Encounter Diagnosis     ICD-10-CM    1. Chronic left shoulder pain  M25.512     G89.29                      Subjective: Pt reports feeling that the stretching is very helpful and that he does the other exercises at the gym. Feels his shoulder mobility is improving      Objective: See treatment diary below      Assessment: Patient tolerated treatment well. Focused mainly on manual therapy for rib mobilizations and shoulder PROM as he is doing TE's as part of his gym routine. He will benefit from skilled PT to address impairments and return to PLOF      Plan: progress mobility     Precautions: none  Functional Limitations: weight lifting  Impairments: L shoulder ROM, (+) median nerve tension, L shoulder strength  [facility] Code: [email or web address]   POC expiration: 2025        POC Expires Reeval for Medicare to be completed Unit LImit Auth Expiration Date PT/OT/STVisit Limit   2025 By visit n/a N/a 2024 N/a    Completed on visit n/a                 Auth Status: n/a     Visit # 8 9 10 11 12 13 14    Visits remaining           Manuals           L shld PROM 10' 10' 10' 20' 15' 25' 25'    L UT STM     4'       Manual median nerve glide 2' 2' 2'        SOR           Cervical distraction           Seated L 1st rib mob grade 3/4 Supine 5' GS PT Supine 5'  Supine 5'  Supine 5'    Neuro Re-Ed           Tb ER BTB 20x  BTB 2x10        TB row           TB shld ext           Body blade            Rhythmic stab           Supine serratus press 2x10 10# KB  2x10 10# KB  2x10  10# KB      Quad push up plus            Median nerve glide   manual        Serratus flex w/ foam roll at wall           Self first rib mob with strap           Serratus flex w/ TB overhead GTB 2x10  GTB 2x10  GTB 2x10      Pushup plus nv  2x10  2x10      TB Y   GTB 20x   "GTB 20x      TB T   GTB 20x  GTB 20x      KB overhead press     10# 2x10                            Ther Ex           Wall slide           Dowel flex supine           UBE 3'/3' 3'/3' 3'/3' 3'/3' 3'/3' 3'/3' 3'/3'    Doorway pec stretch 20\"x3  20\"x3   30\"x3 high and low 30\"x3 high and low    CC single arm extension 44# 2x10  55# 2x10  55# 2x10      CC half kneel single arm row 66# 3x10          Eccentric scaption 2x10 5#  2x10 5#  2x10 5#      Cross body stretch at wall           L Levator scap stretch           Cervical retraction           Prone Rows      10x2 nv    Prone Scap retractions      10x2 nv    Prone b/l shoulder Ext        10x2 nv    Ther Activity                                 Gait Training                                 Modalities                                        "

## 2025-01-02 ENCOUNTER — OFFICE VISIT (OUTPATIENT)
Dept: PHYSICAL THERAPY | Facility: CLINIC | Age: 30
End: 2025-01-02
Payer: OTHER GOVERNMENT

## 2025-01-02 DIAGNOSIS — M25.512 CHRONIC LEFT SHOULDER PAIN: Primary | ICD-10-CM

## 2025-01-02 DIAGNOSIS — G89.29 CHRONIC LEFT SHOULDER PAIN: Primary | ICD-10-CM

## 2025-01-02 PROCEDURE — 97140 MANUAL THERAPY 1/> REGIONS: CPT

## 2025-01-02 NOTE — PROGRESS NOTES
Daily Note     Today's date: 2025  Patient name: Walt Arshad  : 1995  MRN: [id number]  Referring provider: Tiffany Waterman PA-C  Dx:   Encounter Diagnosis     ICD-10-CM    1. Chronic left shoulder pain  M25.512     G89.29                      Subjective: Pt reports shoulder feels good today. He has been doing the exercises when he goes to the gym. Is hoping to try more overhead exercises today      Objective: See treatment diary below      Assessment: Patient tolerated treatment well. Continued manual therapy for first rib mobility and shoulder ROM. Shoulder ROM improves well with PROM in all planes. He will benefit from skilled PT to address impairments and return to PLOF      Plan: progress mobility     Precautions: none  Functional Limitations: weight lifting  Impairments: L shoulder ROM, (+) median nerve tension, L shoulder strength  [facility] Code: [email or web address]   POC expiration: 2025        POC Expires Reeval for Medicare to be completed Unit LImit Auth Expiration Date PT/OT/STVisit Limit   2025 By visit n/a N/a 2024 N/a    Completed on visit n/a                 Auth Status: n/a       Visit # 11 12 13 14 15      Visits remaining           Manuals           L shld PROM 20' 15' 25' 25' 20'      L UT STM  4'          Manual median nerve glide           SOR           Cervical distraction           Seated L 1st rib mob grade 3/4  Supine 5'  Supine 5' Supine 5'      Neuro Re-Ed           Tb ER           TB row           TB shld ext           Body blade            Rhythmic stab           Supine serratus press  2x10  10# KB         Quad push up plus            Median nerve glide           Serratus flex w/ foam roll at wall           Self first rib mob with strap           Serratus flex w/ TB overhead  GTB 2x10         Pushup plus  2x10         TB Y  GTB 20x         TB T  GTB 20x         KB overhead press  10# 2x10                               Ther Ex           Wall  "slide           Dowel flex supine           UBE 3'/3' 3'/3' 3'/3' 3'/3' 3'/3'      Doorway pec stretch   30\"x3 high and low 30\"x3 high and low 30\"x3 high and low      CC single arm extension  55# 2x10         CC half kneel single arm row           Eccentric scaption  2x10 5#         Cross body stretch at wall           L Levator scap stretch           Cervical retraction           Prone Rows   10x2 nv       Prone Scap retractions   10x2 nv       Prone b/l shoulder Ext     10x2 nv       Ther Activity                                 Gait Training                                 Modalities                                        "

## 2025-01-30 ENCOUNTER — APPOINTMENT (OUTPATIENT)
Dept: PHYSICAL THERAPY | Facility: CLINIC | Age: 30
End: 2025-01-30
Payer: OTHER GOVERNMENT

## 2025-02-13 ENCOUNTER — OFFICE VISIT (OUTPATIENT)
Dept: OBGYN CLINIC | Facility: CLINIC | Age: 30
End: 2025-02-13
Payer: OTHER GOVERNMENT

## 2025-02-13 VITALS — WEIGHT: 175 LBS | HEIGHT: 73 IN | BODY MASS INDEX: 23.19 KG/M2

## 2025-02-13 DIAGNOSIS — M77.8 TENDINITIS OF LEFT SHOULDER: Primary | ICD-10-CM

## 2025-02-13 PROCEDURE — 99213 OFFICE O/P EST LOW 20 MIN: CPT | Performed by: ORTHOPAEDIC SURGERY

## 2025-02-13 NOTE — ASSESSMENT & PLAN NOTE
Findings consistent with left shoulder tendinitis.  At this time patient overall is doing well with some mild residual soft tissue parascapular pain. Maintain HEP as shown by PT.  He can gradually return to all activity. See patient back as needed. Note given to return to all activities 3/1/25.  All patient's questions were answered to his satisfaction.  This note is created using dictation transcription.  It may contain typographical errors, grammatical errors, improperly dictated words, background noise and other errors.

## 2025-02-13 NOTE — LETTER
February 13, 2025     Patient: Walt Arshad  YOB: 1995  Date of Visit: 2/13/2025      To Whom it May Concern:    Walt Arshad is under my professional care. Walt was seen in my office on 2/13/2025. Walt can return to full activity/duty with no restrictions as if 3/1/25.     If you have any questions or concerns, please don't hesitate to call.         Sincerely,          Vaughn Jang MD        CC: No Recipients   normal affect

## 2025-02-13 NOTE — PROGRESS NOTES
Assessment:     1. Tendinitis of left shoulder        Plan:     Problem List Items Addressed This Visit          Musculoskeletal and Integument    RESOLVED: Tendinitis of left shoulder - Primary    Findings consistent with left shoulder tendinitis.  At this time patient overall is doing well with some mild residual soft tissue parascapular pain. Maintain HEP as shown by PT.  He can gradually return to all activity. See patient back as needed. Note given to return to all activities 3/1/25.  All patient's questions were answered to his satisfaction.  This note is created using dictation transcription.  It may contain typographical errors, grammatical errors, improperly dictated words, background noise and other errors.            Subjective:     Patient ID: Walt Arshad is a 29 y.o. male.  Chief Complaint:  29-year-old white male here for follow up of his left shoulder.  Treating for tendonitis. MRI arthrogram reviewed at last appt showed no cuff or labral pathology. He has been attending physical therapy which has been beneficial. He is maintaining HEP, daily stretching. Primarily light lifting with reps. He still notes some soft tissue discomfort posterior aspect of shoulder but improving. No weakness, has full motion. He feels he is ready to return to normal activities. History- injury in 2015 while in the .  He states that his arm got pulled behind with it extended overhead.  He felt a pop followed by immediate pain in the left shoulder.  He denies his shoulder dislocating at the time.  He states it took a few weeks for the pain to resolve.  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  The pain did somewhat improve with rest.  He states for the past 1.5 years the pain has been constant and progressively worsening.     Allergy:  No Known Allergies  Medications:  all current active meds have been reviewed  Past Medical History:  Past  "Medical History:   Diagnosis Date    Anxiety     Celiac disease 2023    GERD (gastroesophageal reflux disease)     Tendinitis of left shoulder 2024     Past Surgical History:  Past Surgical History:   Procedure Laterality Date    EGD      FL INJECTION LEFT SHOULDER (ARTHROGRAM)  12/3/2024    WISDOM TOOTH EXTRACTION       Family History:  Family History   Problem Relation Age of Onset    No Known Problems Mother     Cancer Father     No Known Problems Brother     Colon cancer Neg Hx     Colon polyps Neg Hx      Social History:  Social History     Substance and Sexual Activity   Alcohol Use Yes    Alcohol/week: 30.0 standard drinks of alcohol    Types: 30 Cans of beer per week    Comment: weekends     Social History     Substance and Sexual Activity   Drug Use Never     Social History     Tobacco Use   Smoking Status Former    Current packs/day: 0.00    Types: Cigarettes    Quit date:     Years since quittin.1    Passive exposure: Past   Smokeless Tobacco Former     Review of Systems   Constitutional: Negative.    HENT: Negative.     Eyes: Negative.    Respiratory: Negative.     Cardiovascular: Negative.    Gastrointestinal: Negative.    Endocrine: Negative.    Genitourinary: Negative.    Musculoskeletal:  Positive for arthralgias (left shoulder). Negative for joint swelling and neck pain.   Skin: Negative.    Allergic/Immunologic: Negative.    Neurological: Negative.  Negative for weakness and numbness.   Hematological: Negative.    Psychiatric/Behavioral: Negative.           Objective:  BP Readings from Last 1 Encounters:   24 120/80      Wt Readings from Last 1 Encounters:   25 79.4 kg (175 lb)      BMI:   Estimated body mass index is 23.09 kg/m² as calculated from the following:    Height as of this encounter: 6' 1\" (1.854 m).    Weight as of this encounter: 79.4 kg (175 lb).  BSA:   Estimated body surface area is 2.03 meters squared as calculated from the following:    Height as of " "this encounter: 6' 1\" (1.854 m).    Weight as of this encounter: 79.4 kg (175 lb).   Physical Exam  Vitals and nursing note reviewed.   Constitutional:       General: He is not in acute distress.     Appearance: Normal appearance. He is well-developed.   HENT:      Head: Normocephalic and atraumatic.      Right Ear: External ear normal.      Left Ear: External ear normal.   Eyes:      Extraocular Movements: Extraocular movements intact.      Conjunctiva/sclera: Conjunctivae normal.   Pulmonary:      Effort: Pulmonary effort is normal. No respiratory distress.   Musculoskeletal:      Cervical back: Neck supple.   Skin:     General: Skin is warm and dry.   Neurological:      Mental Status: He is alert and oriented to person, place, and time.   Psychiatric:         Mood and Affect: Mood normal.         Behavior: Behavior normal.       Left Shoulder Exam     Tenderness   Left shoulder tenderness location: parascapular.    Range of Motion   Active abduction:  normal   Passive abduction:  normal   Forward flexion:  normal   Internal rotation 0 degrees:  T12 (pain) normal   Internal rotation 90 degrees:  abnormal     Muscle Strength   Abduction: 5/5   Internal rotation: 5/5   External rotation: 5/5   Biceps: 5/5     Tests   Guerra test: negative  Cross arm: negative  Impingement: negative  Drop arm: negative  Sulcus: absent    Other   Erythema: absent  Scars: absent  Sensation: normal  Pulse: present     Comments:  Positive Speeds  Negative empty can            No new imaging to review      Scribe Attestation      I,:  Steven Johnson am acting as a scribe while in the presence of the attending physician.:       I,:  Vaughn Jang MD personally performed the services described in this documentation    as scribed in my presence.:            "